# Patient Record
Sex: FEMALE | Race: WHITE | NOT HISPANIC OR LATINO | Employment: FULL TIME | URBAN - METROPOLITAN AREA
[De-identification: names, ages, dates, MRNs, and addresses within clinical notes are randomized per-mention and may not be internally consistent; named-entity substitution may affect disease eponyms.]

---

## 2021-04-26 LAB
HEPATITIS B SURFACE ANTIGEN (EXTERNAL): NEGATIVE
HIV1+2 AB SERPL QL IA: NEGATIVE
RUBELLA ANTIBODY IGG (EXTERNAL): NORMAL

## 2021-10-27 LAB — GROUP B STREPTOCOCCUS (EXTERNAL): NEGATIVE

## 2021-11-18 ENCOUNTER — HOSPITAL ENCOUNTER (OUTPATIENT)
Facility: CLINIC | Age: 36
Discharge: HOME | End: 2021-11-19
Attending: OBSTETRICS & GYNECOLOGY | Admitting: OBSTETRICS & GYNECOLOGY
Payer: COMMERCIAL

## 2021-11-18 PROBLEM — Z36.89 ENCOUNTER FOR TRIAGE IN PREGNANT PATIENT: Status: ACTIVE | Noted: 2021-11-18

## 2021-11-18 PROCEDURE — G0463 HOSPITAL OUTPT CLINIC VISIT: HCPCS

## 2021-11-18 RX ORDER — LIDOCAINE 40 MG/G
CREAM TOPICAL
Status: DISCONTINUED | OUTPATIENT
Start: 2021-11-18 | End: 2021-11-19 | Stop reason: HOSPADM

## 2021-11-19 VITALS
DIASTOLIC BLOOD PRESSURE: 81 MMHG | SYSTOLIC BLOOD PRESSURE: 132 MMHG | HEIGHT: 64 IN | BODY MASS INDEX: 30.39 KG/M2 | WEIGHT: 178 LBS | OXYGEN SATURATION: 97 % | TEMPERATURE: 97.9 F

## 2021-11-19 PROCEDURE — G0463 HOSPITAL OUTPT CLINIC VISIT: HCPCS

## 2021-11-19 RX ORDER — PRENATAL VIT/IRON FUM/FOLIC AC 27MG-0.8MG
1 TABLET ORAL DAILY
COMMUNITY

## 2021-11-19 RX ORDER — CHLORAL HYDRATE 500 MG
CAPSULE ORAL DAILY
COMMUNITY

## 2021-11-19 ASSESSMENT — MIFFLIN-ST. JEOR: SCORE: 1487.4

## 2021-11-19 NOTE — PLAN OF CARE
Data: Patient assessed in the [facility] for uterine contractions.  Cervical exam dilated to 1.  Membranes intact.  Contractions/uterine assessment unchanged in frequency/intensity from arrival.  Action:  Presumed adequate fetal oxygenation documented (see flow record). Discharge instructions reviewed.  Patient instructed to report change in fetal movement, vaginal leaking of fluid or bleeding, abdominal pain, or any concerns related to the pregnancy to her nurse/physician.    Response: Orders to discharge home per Odilia Ibarra.  Patient verbalized understanding of education and verbalized agreement with plan. Discharged to home at 0230.

## 2021-11-19 NOTE — PLAN OF CARE
Data: Patient presented to [facility]: 2021 11:45 PM.  Reason for maternal/fetal assessment is uterine contractions. Patient reports she was seen in clinic today and her membranes were swept, and she subsequently began having contractions at 1600.  The contractions have increased in frequency and discomfort since 1600.  Patient is a .  Prenatal record reviewed. Pregnancy has been uncomplicated..  Gestational Age 40w0d. VSS. Fetal movement active. Patient denies leaking of vaginal fluid/rupture of membranes, vaginal bleeding, nausea, vomiting, headache, visual disturbances, epigastric or URQ pain, significant edema. Support person is present.   Action: Verbal consent for EFM.  Tracing initially frequently broken and vacillating between FHR and MHR - uncertain if decels are present.  US report notable for anterior placenta.  Patient was repositioned to right side, EFM adjusted, pulse oximeter applied, revealing FHR baseline of 125 with moderate variability, accelerations, and no decelerations.  Triage assessment completed. Bill of rights reviewed.  Response: Patient verbalized agreement with plan. Will contact Dr Odilia Ibarra with update and for further orders.

## 2021-11-19 NOTE — PROVIDER NOTIFICATION
21 0055   Provider Notification   Provider Name/Title Dr. Ibarra   Method of Notification Phone   Request Evaluate - Remote   Notification Reason Patient Arrived;Membrane Status;Uterine Activity;Pain;SVE;Other (Comment)   Dr Odilia Ibarra informed of patient arrival and assessment including the following:    Reason for maternal/fetal assessment uterine contractions. Pt reports she was seen in clinic today and her membranes were swept, and she subsequently began having contractions at 1600.  The contractions have increased in frequency and discomfort since 1600, currently rates pain 3/10, and is talking through contractions.  Patient is a . Gestational Age 40w0d. VSS. Fetal movement active. Patient denies leaking of vaginal fluid/rupture of membranes, vaginal bleeding, nausea, vomiting, headache, visual disturbances, epigastric or URQ pain, significant edema. Tracing initially frequently broken and vacillating between FHR and MHR - uncertain if decels were present.  US report notable for anterior placenta.  Patient was repositioned to right side, EFM adjusted, pulse oximeter applied, revealing FHR baseline of 125 with moderate variability, accelerations, and no decelerations.. Plan per provider/orders received to recheck cervix in 1-1.5 hours; if cervix unchanged, orders to discharge patient to home.  If cervix changes or abnormalities in FHR tracing are present, plan to update MD for further orders.

## 2021-11-19 NOTE — DISCHARGE INSTRUCTIONS
Discharge Instruction for Undelivered Patients      You were seen for: Labor Assessment  We Consulted: Dr. Ibarra  You had (Test or Medicine):Fetal and Uterine Monitoring     Diet:   Drink 8 to 12 glasses of liquids (milk, juice, water) every day.  You may eat meals and snacks.     Activity:  Count fetal kicks everyday (see handout)  Call your doctor or nurse midwife if your baby is moving less than usual.     Call your provider if you notice:  Swelling in your face or increased swelling in your hands or legs.  Headaches that are not relieved by Tylenol (acetaminophen).  Changes in your vision (blurring: seeing spots or stars.)  Nausea (sick to your stomach) and vomiting (throwing up).   Weight gain of 5 pounds or more per week.  Heartburn that doesn't go away.  Signs of bladder infection: pain when you urinate (use the toilet), need to go more often and more urgently.  The bag of cespedes (rupture of membranes) breaks, or you notice leaking in your underwear.  Bright red blood in your underwear.  Abdominal (lower belly) or stomach pain.  For first baby: Contractions (tightening) less than 5 minutes apart for one hour or more.  Second (plus) baby: Contractions (tightening) less than 10 minutes apart and getting stronger.  *If less than 34 weeks: Contractions (tightening) more than 6 times in one hour.  Increase or change in vaginal discharge (note the color and amount)    Follow-up:  As scheduled in the clinic

## 2021-11-22 ENCOUNTER — ANESTHESIA EVENT (OUTPATIENT)
Dept: OBGYN | Facility: CLINIC | Age: 36
End: 2021-11-22
Payer: COMMERCIAL

## 2021-11-22 ENCOUNTER — ANESTHESIA (OUTPATIENT)
Dept: OBGYN | Facility: CLINIC | Age: 36
End: 2021-11-22
Payer: COMMERCIAL

## 2021-11-22 ENCOUNTER — HOSPITAL ENCOUNTER (INPATIENT)
Facility: CLINIC | Age: 36
LOS: 2 days | Discharge: HOME | End: 2021-11-24
Attending: OBSTETRICS & GYNECOLOGY | Admitting: OBSTETRICS & GYNECOLOGY
Payer: COMMERCIAL

## 2021-11-22 LAB
ABO/RH(D): ABNORMAL
ANTIBODY ID: NORMAL
ANTIBODY SCREEN: POSITIVE
HGB BLD-MCNC: 12.5 G/DL (ref 11.7–15.7)
SARS-COV-2 RNA RESP QL NAA+PROBE: NEGATIVE
SPECIMEN EXPIRATION DATE: ABNORMAL
SPECIMEN EXPIRATION DATE: NORMAL
T PALLIDUM AB SER QL: NONREACTIVE

## 2021-11-22 PROCEDURE — G0463 HOSPITAL OUTPT CLINIC VISIT: HCPCS

## 2021-11-22 PROCEDURE — 370N000003 HC ANESTHESIA WARD SERVICE

## 2021-11-22 PROCEDURE — 85018 HEMOGLOBIN: CPT | Performed by: OBSTETRICS & GYNECOLOGY

## 2021-11-22 PROCEDURE — 120N000001 HC R&B MED SURG/OB

## 2021-11-22 PROCEDURE — 86780 TREPONEMA PALLIDUM: CPT | Performed by: OBSTETRICS & GYNECOLOGY

## 2021-11-22 PROCEDURE — 86870 RBC ANTIBODY IDENTIFICATION: CPT | Performed by: OBSTETRICS & GYNECOLOGY

## 2021-11-22 PROCEDURE — 250N000011 HC RX IP 250 OP 636: Performed by: OBSTETRICS & GYNECOLOGY

## 2021-11-22 PROCEDURE — 0UQGXZZ REPAIR VAGINA, EXTERNAL APPROACH: ICD-10-PCS | Performed by: OBSTETRICS & GYNECOLOGY

## 2021-11-22 PROCEDURE — 86900 BLOOD TYPING SEROLOGIC ABO: CPT | Performed by: OBSTETRICS & GYNECOLOGY

## 2021-11-22 PROCEDURE — 250N000011 HC RX IP 250 OP 636: Performed by: ANESTHESIOLOGY

## 2021-11-22 PROCEDURE — 10907ZC DRAINAGE OF AMNIOTIC FLUID, THERAPEUTIC FROM PRODUCTS OF CONCEPTION, VIA NATURAL OR ARTIFICIAL OPENING: ICD-10-PCS | Performed by: OBSTETRICS & GYNECOLOGY

## 2021-11-22 PROCEDURE — 258N000003 HC RX IP 258 OP 636: Performed by: OBSTETRICS & GYNECOLOGY

## 2021-11-22 PROCEDURE — 722N000001 HC LABOR CARE VAGINAL DELIVERY SINGLE

## 2021-11-22 PROCEDURE — 250N000009 HC RX 250: Performed by: OBSTETRICS & GYNECOLOGY

## 2021-11-22 PROCEDURE — 00HU33Z INSERTION OF INFUSION DEVICE INTO SPINAL CANAL, PERCUTANEOUS APPROACH: ICD-10-PCS | Performed by: ANESTHESIOLOGY

## 2021-11-22 PROCEDURE — 87635 SARS-COV-2 COVID-19 AMP PRB: CPT | Performed by: OBSTETRICS & GYNECOLOGY

## 2021-11-22 PROCEDURE — 3E0R3BZ INTRODUCTION OF ANESTHETIC AGENT INTO SPINAL CANAL, PERCUTANEOUS APPROACH: ICD-10-PCS | Performed by: ANESTHESIOLOGY

## 2021-11-22 RX ORDER — ACETAMINOPHEN 325 MG/1
650 TABLET ORAL EVERY 4 HOURS PRN
Status: DISCONTINUED | OUTPATIENT
Start: 2021-11-22 | End: 2021-11-24 | Stop reason: HOSPADM

## 2021-11-22 RX ORDER — OXYTOCIN 10 [USP'U]/ML
10 INJECTION, SOLUTION INTRAMUSCULAR; INTRAVENOUS
Status: DISCONTINUED | OUTPATIENT
Start: 2021-11-22 | End: 2021-11-22 | Stop reason: HOSPADM

## 2021-11-22 RX ORDER — METOCLOPRAMIDE 10 MG/1
10 TABLET ORAL EVERY 6 HOURS PRN
Status: DISCONTINUED | OUTPATIENT
Start: 2021-11-22 | End: 2021-11-22 | Stop reason: HOSPADM

## 2021-11-22 RX ORDER — METOCLOPRAMIDE HYDROCHLORIDE 5 MG/ML
10 INJECTION INTRAMUSCULAR; INTRAVENOUS EVERY 6 HOURS PRN
Status: DISCONTINUED | OUTPATIENT
Start: 2021-11-22 | End: 2021-11-22 | Stop reason: HOSPADM

## 2021-11-22 RX ORDER — CARBOPROST TROMETHAMINE 250 UG/ML
250 INJECTION, SOLUTION INTRAMUSCULAR
Status: DISCONTINUED | OUTPATIENT
Start: 2021-11-22 | End: 2021-11-22

## 2021-11-22 RX ORDER — OXYTOCIN/0.9 % SODIUM CHLORIDE 30/500 ML
1-24 PLASTIC BAG, INJECTION (ML) INTRAVENOUS CONTINUOUS
Status: DISCONTINUED | OUTPATIENT
Start: 2021-11-22 | End: 2021-11-22 | Stop reason: HOSPADM

## 2021-11-22 RX ORDER — DEXTROSE, SODIUM CHLORIDE, SODIUM LACTATE, POTASSIUM CHLORIDE, AND CALCIUM CHLORIDE 5; .6; .31; .03; .02 G/100ML; G/100ML; G/100ML; G/100ML; G/100ML
INJECTION, SOLUTION INTRAVENOUS CONTINUOUS
Status: DISCONTINUED | OUTPATIENT
Start: 2021-11-22 | End: 2021-11-22

## 2021-11-22 RX ORDER — KETOROLAC TROMETHAMINE 30 MG/ML
30 INJECTION, SOLUTION INTRAMUSCULAR; INTRAVENOUS
Status: DISCONTINUED | OUTPATIENT
Start: 2021-11-22 | End: 2021-11-24 | Stop reason: HOSPADM

## 2021-11-22 RX ORDER — MISOPROSTOL 200 UG/1
400 TABLET ORAL
Status: DISCONTINUED | OUTPATIENT
Start: 2021-11-22 | End: 2021-11-22 | Stop reason: HOSPADM

## 2021-11-22 RX ORDER — IBUPROFEN 600 MG/1
600 TABLET, FILM COATED ORAL
Status: DISCONTINUED | OUTPATIENT
Start: 2021-11-22 | End: 2021-11-24 | Stop reason: HOSPADM

## 2021-11-22 RX ORDER — OXYTOCIN/0.9 % SODIUM CHLORIDE 30/500 ML
100-340 PLASTIC BAG, INJECTION (ML) INTRAVENOUS CONTINUOUS PRN
Status: DISCONTINUED | OUTPATIENT
Start: 2021-11-22 | End: 2021-11-22

## 2021-11-22 RX ORDER — MISOPROSTOL 200 UG/1
800 TABLET ORAL
Status: DISCONTINUED | OUTPATIENT
Start: 2021-11-22 | End: 2021-11-22

## 2021-11-22 RX ORDER — ONDANSETRON 2 MG/ML
4 INJECTION INTRAMUSCULAR; INTRAVENOUS EVERY 6 HOURS PRN
Status: DISCONTINUED | OUTPATIENT
Start: 2021-11-22 | End: 2021-11-22 | Stop reason: HOSPADM

## 2021-11-22 RX ORDER — DOCUSATE SODIUM 100 MG/1
100 CAPSULE, LIQUID FILLED ORAL DAILY
Status: DISCONTINUED | OUTPATIENT
Start: 2021-11-22 | End: 2021-11-24 | Stop reason: HOSPADM

## 2021-11-22 RX ORDER — OXYTOCIN 10 [USP'U]/ML
10 INJECTION, SOLUTION INTRAMUSCULAR; INTRAVENOUS
Status: DISCONTINUED | OUTPATIENT
Start: 2021-11-22 | End: 2021-11-22

## 2021-11-22 RX ORDER — LIDOCAINE 40 MG/G
CREAM TOPICAL
Status: DISCONTINUED | OUTPATIENT
Start: 2021-11-22 | End: 2021-11-22 | Stop reason: HOSPADM

## 2021-11-22 RX ORDER — TRANEXAMIC ACID 10 MG/ML
1 INJECTION, SOLUTION INTRAVENOUS EVERY 30 MIN PRN
Status: DISCONTINUED | OUTPATIENT
Start: 2021-11-22 | End: 2021-11-22 | Stop reason: HOSPADM

## 2021-11-22 RX ORDER — OXYTOCIN/0.9 % SODIUM CHLORIDE 30/500 ML
340 PLASTIC BAG, INJECTION (ML) INTRAVENOUS CONTINUOUS PRN
Status: DISCONTINUED | OUTPATIENT
Start: 2021-11-22 | End: 2021-11-22

## 2021-11-22 RX ORDER — PROCHLORPERAZINE MALEATE 10 MG
10 TABLET ORAL EVERY 6 HOURS PRN
Status: DISCONTINUED | OUTPATIENT
Start: 2021-11-22 | End: 2021-11-22 | Stop reason: HOSPADM

## 2021-11-22 RX ORDER — BUPIVACAINE HYDROCHLORIDE 2.5 MG/ML
INJECTION, SOLUTION EPIDURAL; INFILTRATION; INTRACAUDAL
Status: COMPLETED | OUTPATIENT
Start: 2021-11-22 | End: 2021-11-22

## 2021-11-22 RX ORDER — NALOXONE HYDROCHLORIDE 0.4 MG/ML
0.2 INJECTION, SOLUTION INTRAMUSCULAR; INTRAVENOUS; SUBCUTANEOUS
Status: DISCONTINUED | OUTPATIENT
Start: 2021-11-22 | End: 2021-11-22 | Stop reason: HOSPADM

## 2021-11-22 RX ORDER — IBUPROFEN 800 MG/1
800 TABLET, FILM COATED ORAL EVERY 6 HOURS PRN
Status: DISCONTINUED | OUTPATIENT
Start: 2021-11-22 | End: 2021-11-24 | Stop reason: HOSPADM

## 2021-11-22 RX ORDER — MISOPROSTOL 200 UG/1
800 TABLET ORAL
Status: DISCONTINUED | OUTPATIENT
Start: 2021-11-22 | End: 2021-11-22 | Stop reason: HOSPADM

## 2021-11-22 RX ORDER — METHYLERGONOVINE MALEATE 0.2 MG/ML
200 INJECTION INTRAVENOUS
Status: DISCONTINUED | OUTPATIENT
Start: 2021-11-22 | End: 2021-11-22

## 2021-11-22 RX ORDER — NALOXONE HYDROCHLORIDE 0.4 MG/ML
0.4 INJECTION, SOLUTION INTRAMUSCULAR; INTRAVENOUS; SUBCUTANEOUS
Status: DISCONTINUED | OUTPATIENT
Start: 2021-11-22 | End: 2021-11-22 | Stop reason: HOSPADM

## 2021-11-22 RX ORDER — HYDROCORTISONE 2.5 %
CREAM (GRAM) TOPICAL 3 TIMES DAILY PRN
Status: DISCONTINUED | OUTPATIENT
Start: 2021-11-22 | End: 2021-11-24 | Stop reason: HOSPADM

## 2021-11-22 RX ORDER — FENTANYL CITRATE-0.9 % NACL/PF 10 MCG/ML
100 PLASTIC BAG, INJECTION (ML) INTRAVENOUS EVERY 5 MIN PRN
Status: DISCONTINUED | OUTPATIENT
Start: 2021-11-22 | End: 2021-11-22 | Stop reason: HOSPADM

## 2021-11-22 RX ORDER — SODIUM CHLORIDE, SODIUM LACTATE, POTASSIUM CHLORIDE, CALCIUM CHLORIDE 600; 310; 30; 20 MG/100ML; MG/100ML; MG/100ML; MG/100ML
INJECTION, SOLUTION INTRAVENOUS CONTINUOUS
Status: DISCONTINUED | OUTPATIENT
Start: 2021-11-22 | End: 2021-11-22 | Stop reason: HOSPADM

## 2021-11-22 RX ORDER — NALBUPHINE HYDROCHLORIDE 10 MG/ML
2.5-5 INJECTION, SOLUTION INTRAMUSCULAR; INTRAVENOUS; SUBCUTANEOUS EVERY 6 HOURS PRN
Status: DISCONTINUED | OUTPATIENT
Start: 2021-11-22 | End: 2021-11-22

## 2021-11-22 RX ORDER — PROCHLORPERAZINE 25 MG
25 SUPPOSITORY, RECTAL RECTAL EVERY 12 HOURS PRN
Status: DISCONTINUED | OUTPATIENT
Start: 2021-11-22 | End: 2021-11-22 | Stop reason: HOSPADM

## 2021-11-22 RX ORDER — CARBOPROST TROMETHAMINE 250 UG/ML
250 INJECTION, SOLUTION INTRAMUSCULAR
Status: DISCONTINUED | OUTPATIENT
Start: 2021-11-22 | End: 2021-11-22 | Stop reason: HOSPADM

## 2021-11-22 RX ORDER — MISOPROSTOL 200 UG/1
400 TABLET ORAL
Status: DISCONTINUED | OUTPATIENT
Start: 2021-11-22 | End: 2021-11-22

## 2021-11-22 RX ORDER — OXYTOCIN/0.9 % SODIUM CHLORIDE 30/500 ML
340 PLASTIC BAG, INJECTION (ML) INTRAVENOUS CONTINUOUS PRN
Status: DISCONTINUED | OUTPATIENT
Start: 2021-11-22 | End: 2021-11-22 | Stop reason: HOSPADM

## 2021-11-22 RX ORDER — MODIFIED LANOLIN
OINTMENT (GRAM) TOPICAL
Status: DISCONTINUED | OUTPATIENT
Start: 2021-11-22 | End: 2021-11-24 | Stop reason: HOSPADM

## 2021-11-22 RX ORDER — BISACODYL 10 MG
10 SUPPOSITORY, RECTAL RECTAL DAILY PRN
Status: DISCONTINUED | OUTPATIENT
Start: 2021-11-22 | End: 2021-11-24 | Stop reason: HOSPADM

## 2021-11-22 RX ORDER — SODIUM CHLORIDE, SODIUM LACTATE, POTASSIUM CHLORIDE, CALCIUM CHLORIDE 600; 310; 30; 20 MG/100ML; MG/100ML; MG/100ML; MG/100ML
INJECTION, SOLUTION INTRAVENOUS CONTINUOUS PRN
Status: DISCONTINUED | OUTPATIENT
Start: 2021-11-22 | End: 2021-11-22 | Stop reason: HOSPADM

## 2021-11-22 RX ORDER — FENTANYL CITRATE 50 UG/ML
50-100 INJECTION, SOLUTION INTRAMUSCULAR; INTRAVENOUS
Status: DISCONTINUED | OUTPATIENT
Start: 2021-11-22 | End: 2021-11-22 | Stop reason: HOSPADM

## 2021-11-22 RX ORDER — ONDANSETRON 4 MG/1
4 TABLET, ORALLY DISINTEGRATING ORAL EVERY 6 HOURS PRN
Status: DISCONTINUED | OUTPATIENT
Start: 2021-11-22 | End: 2021-11-22 | Stop reason: HOSPADM

## 2021-11-22 RX ORDER — TRANEXAMIC ACID 10 MG/ML
1 INJECTION, SOLUTION INTRAVENOUS EVERY 30 MIN PRN
Status: DISCONTINUED | OUTPATIENT
Start: 2021-11-22 | End: 2021-11-22

## 2021-11-22 RX ADMIN — Medication: at 06:09

## 2021-11-22 RX ADMIN — FENTANYL CITRATE 100 MCG: 50 INJECTION INTRAMUSCULAR; INTRAVENOUS at 04:39

## 2021-11-22 RX ADMIN — Medication 1 MILLI-UNITS/MIN: at 11:16

## 2021-11-22 RX ADMIN — KETOROLAC TROMETHAMINE 30 MG: 30 INJECTION, SOLUTION INTRAMUSCULAR at 18:48

## 2021-11-22 RX ADMIN — FENTANYL CITRATE 100 MCG: 50 INJECTION INTRAMUSCULAR; INTRAVENOUS at 02:50

## 2021-11-22 RX ADMIN — SODIUM CHLORIDE, POTASSIUM CHLORIDE, SODIUM LACTATE AND CALCIUM CHLORIDE: 600; 310; 30; 20 INJECTION, SOLUTION INTRAVENOUS at 17:24

## 2021-11-22 RX ADMIN — BUPIVACAINE HYDROCHLORIDE 8 ML: 2.5 INJECTION, SOLUTION EPIDURAL; INFILTRATION; INTRACAUDAL at 06:03

## 2021-11-22 RX ADMIN — SODIUM CHLORIDE, SODIUM LACTATE, POTASSIUM CHLORIDE, CALCIUM CHLORIDE AND DEXTROSE MONOHYDRATE: 5; 600; 310; 30; 20 INJECTION, SOLUTION INTRAVENOUS at 09:22

## 2021-11-22 RX ADMIN — SODIUM CHLORIDE, POTASSIUM CHLORIDE, SODIUM LACTATE AND CALCIUM CHLORIDE: 600; 310; 30; 20 INJECTION, SOLUTION INTRAVENOUS at 02:40

## 2021-11-22 ASSESSMENT — ACTIVITIES OF DAILY LIVING (ADL)
ADLS_ACUITY_SCORE: 3

## 2021-11-22 ASSESSMENT — MIFFLIN-ST. JEOR: SCORE: 1487.4

## 2021-11-22 NOTE — PROGRESS NOTES
"LABOR NOTE    Subjective: Reports increasing pain with contractions. Requests a second dose of fentanyl. Pain noted mostly in her lower back, with some coupling of ctx on toco. Prefers to rest in bed on her back.    Objective:  /66   Temp 98.6  F (37  C) (Oral)   Resp 16   Ht 1.626 m (5' 4\")   Wt 80.7 kg (178 lb)   BMI 30.55 kg/m     FHT: category 1  [place]: q 2-4 min  SVE: 3.5/60/-2    Assessment and Plan:    1. 36 year old  female at 40w3d  2. FHT category 1.   3. Presentation consistent with occiput posterior positioning    Latent labor  --admit for expectant management  --presents with birth plan, reviewed by RN  --undecided on pain control plan  --requests to avoid AROM  --GBS neg     Rh negative  --s/p Rhogam     Hx anxiety/depression  --no meds  --PPD risk     Hx asthma  --stable     Hx abn pap with colposcopy  --no LEEP     Hx migraines  --stable x 3 years      Holley Barrera MD    "

## 2021-11-22 NOTE — ANESTHESIA PROCEDURE NOTES
Dural puncture epidural Procedure Note    Pre-Procedure   Staff -        Anesthesiologist:  Caitlin Rich MD       Performed By: anesthesiologist       Location: OB       Procedure Start/Stop Times: 11/22/2021 5:51 AM and 11/22/2021 6:10 AM       Pre-Anesthestic Checklist: patient identified, IV checked, risks and benefits discussed, informed consent, monitors and equipment checked and pre-op evaluation  Timeout:       Correct Patient: Yes        Correct Procedure: Yes        Correct Site: Yes        Correct Position: Yes   Procedure Documentation  Procedure: dural puncture epidural       Diagnosis: labor pain       Patient Position: sitting       Skin prep: Chloraprep       Local skin infiltrated with 3 mL of 2% lidocaine.        Insertion Site: L3-4. (midline approach).       Technique: LORT saline        ANGELES at 5 cm.       Needle Type: [employer]       Needle Gauge: 17.        Needle Length (Inches): 3.5        Spinal Needle Type: Pencan       Spinal Needle (gauge): 25        Spinal Needle Length (inches): 4.69       Catheter: 19 G.         Catheter threaded easily.         5 cm epidural space.         Threaded 10 cm at skin.         # of attempts: 1 and  # of redirects:  2    Assessment/Narrative         Paresthesias: No.       Test dose of 3 mL lidocaine 1.5% w/ 1:200,000 epinephrine at 05:59 CST.         Test dose negative, 3 minutes after injection, for signs of intravascular, subdural, or intrathecal injection.       Insertion/Infusion Method: LORT saline       Aspiration negative for Heme or CSF via Epidural Catheter.       Sensory Level Left: T10.       Sensory Level Right: T10.       CSF fluid: with Spinal needle.CSF fluid removed: with Epidural needle - not with Epidural needle.    Medication(s) Administered   0.25% Bupivacaine PF (Intrathecal), 8 mL  Medication Administration Time: 11/22/2021 6:03 AM

## 2021-11-22 NOTE — H&P
"  2021    Cintia Fang  [id number]            OB Admit History & Physical      Ms. Fang  is here as an admission for labor.    She has noticed contractions since about 2100, with an increase in intensity at about 2230. On arrival, she was marilyn every 1-4 minutes, mild to moderate in intensity. She was noted to have made change from clinic, and was admitted for expectant management.    No LMP recorded. Patient is pregnant.   Her Estimated Date of Delivery: 2021  , making her 40w3d  wks.      Estimated body mass index is 30.55 kg/m  as calculated from the following:    Height as of this encounter: 1.626 m (5' 4\").    Weight as of this encounter: 80.7 kg (178 lb).  Her prenatal course has been complicated by:  ASCUS with positive high risk HPV cervical 2015   Colpo: neg. Repeat pap with HPV 2015: ; ASCUS with positive other high risk HPV     Asthma (ACG)     Eczema, dyshidrotic 8/15/2011     Generalized anxiety disorder (HRC) 3/12/2014   Stable, no meds     Generalized headaches 2012     Lumbago (HRC) 2006   Pain Low Back     Major depressive disorder, recurrent episode, moderate (HRC) 3/12/2014     Migraine, chronic, without aura 3/12/2014   Managed with diet, exercise, no meds     Psychological factors associated with another disorder (HRC) 3/12/2014   Psychic factors associated with diseases classified elsewhere     STD (sexually transmitted disease) (HRC)   hpv     Traumatic closed nondisplaced fracture of distal end of left fibula 2019   Added automatically from request for surgery [id number]     Varicella     See prenatal for labs.  neg GBBS, Rubella Immune, RH negative s/p Rhogam    Estimated fetal weight= AGA       She is a 36 year old   Her OB history:   OB History    Para Term  AB Living   2 0 0 0 1 0   SAB IAB Ectopic Multiple Live Births   0 0 0 0 0      # Outcome Date GA Lbr Huang/2nd Weight Sex Delivery Anes PTL Lv   2 Current            1 " AB                     Past Medical History:   Diagnosis Date     Migraines      Uncomplicated asthma     use inhaler prn          Past Surgical History:   Procedure Laterality Date     ANKLE SURGERY Left      C ORAL SURGERY PROCEDURE      wisdom teeth removed     COSMETIC SURGERY      reconstructive surgery - face - following dog bite     TONSILLECTOMY           No current outpatient medications on file.       Allergies: Oxycodone-acetaminophen      REVIEW OF SYSTEMS:  NEUROLOGIC:  Negative  EYES:  Negative  ENT:  Negative  GI:  Negative  BREAST:  Negative  :  Negative  GYN:  Negative  CV:  Negative  PULMONARY:  Negative  MUSCULOSKELETAL:  Negative  PSYCH:  Negative        Social History     Socioeconomic History     Marital status:      Spouse name: Not on file     Number of children: Not on file     Years of education: Not on file     Highest education level: Not on file   Occupational History     Not on file   Tobacco Use     Smoking status: Former Smoker     Smokeless tobacco: Never Used   Substance and Sexual Activity     Alcohol use: Not Currently     Drug use: Not Currently     Sexual activity: Yes   Other Topics Concern     Not on file   Social History Narrative     Not on file     Social Determinants of Health     Financial Resource Strain: Not on file   Food Insecurity: Not on file   Transportation Needs: Not on file   Physical Activity: Not on file   Stress: Not on file   Social Connections: Not on file   Intimate Partner Violence: Not on file   Housing Stability: Not on file      History reviewed. No pertinent family history.    Vitals:   FHT category 1    Alert Awake in NAD  HEENT grossly normal  Neck: no lymphadenopathy or thryoidomegaly  Lungs CTAB  Back no spinal or CVAT  Heart RRR  ABD gravid, nontender on exam with vertex palpable  Pelvic:  no fluid noted, no blood noted  Cervix is 2-3 cm / 60 % effaced at -2 station  EXT:  no edema or calf tenderness  Neuro:  Grossly  intact    Assessment/Plan:  IUP at 40w3d      Latent labor  --admit for expectant management  --presents with birth plan, reviewed by RN  --undecided on pain control plan  --requests to avoid AROM  --GBS neg    Rh negative  --s/p Rhogam    Hx anxiety/depression  --no meds  --PPD risk    Hx asthma  --stable    Hx abn pap with colposcopy  --no LEEP    Hx migraines  --stable x 3 years    Holley Barrera MD  Dept of OB/GYN  November 22, 2021

## 2021-11-22 NOTE — PROVIDER NOTIFICATION
11/22/21 0730   Provider Notification   Provider Name/Title Dr Ibarra   Method of Notification At Bedside   Comments   Comments EFM tracing reviewed. Discussed with pt benefit of AROM RT BBOW. Pt agrees to proceed with AROM.

## 2021-11-22 NOTE — PROVIDER NOTIFICATION
11/22/21 1151   Provider Notification   Provider Name/Title Dr Ibarra   Method of Notification In Department   Comments   Comments Updated on ctx pattern following the starting of Pitocin augmentation.

## 2021-11-22 NOTE — PROVIDER NOTIFICATION
11/22/21 0048   Provider Notification   Provider Name/Title    Method of Notification Phone   Request Evaluate - Remote   Notification Reason Patient Arrived;Membrane Status;Labor Status;Uterine Activity;Pain;SVE     Dr. Holley Barrera informed of patient arrival and assessment including the following:    Reason for maternal/fetal assessment uterine contractions. Pt reports contractions started at 2100 and became stronger at 2230. Fetal status normal baseline, moderate variability, accelerations present and no decelerations. Contractions every 1-4 min, palpating mild to moderate. Patient reports coping with labor, able to breath with contractions at this point. Prenatal history reviewed, GBS negative. Birth plan reviewed.Plan per provider/orders received for admit to labor, intrapartum orders received, nitrous oxide ok, IV fentanyl ok, epidural ok, intermittent monitoring ok, place IV, collect labs (T&S, hemoglobin and anti-trepnoma) collect Covid swab. POC discussed with patient and FOB.

## 2021-11-22 NOTE — PROGRESS NOTES
Late Entry from 0740    Patient is doing well.  Much more comfortable s/p epidural.  She is amenable to AROM.    FHT: 130/mod/+accel/-decel  [place]: q 3-6 min  SVE: 4-5/80/-2  AROM'ed with clear fluid. Head well applied to cervix with no cord prolapse.  FSE was also placed after AROM as FHR noted to be in low 100s, but not continuous.  FHT cat I after FSE placed.     A&P: 37yo  at 40+3 wga, spontaneous labor  - Labor: Augmentation with uncomplicated AROM.  If contractions space, will start pitocin.  - h/o Anx, Dep: no meds.  Mood stable with no SI/HI  - A neg: Rhogam postpartum, pending infant's blood type  - GBS neg  - PP contraception: POPs  - Anticipate

## 2021-11-22 NOTE — ANESTHESIA PREPROCEDURE EVALUATION
Anesthesia Pre-Procedure Evaluation    Patient: Cintia Fang   MRN: [id number] : 1985        Preoperative Diagnosis: * No surgery found *    Procedure :           Past Medical History:   Diagnosis Date     Migraines      Uncomplicated asthma     use inhaler prn      Past Surgical History:   Procedure Laterality Date     ANKLE SURGERY Left      C ORAL SURGERY PROCEDURE      wisdom teeth removed     COSMETIC SURGERY      reconstructive surgery - face - following dog bite     TONSILLECTOMY        Allergies   Allergen Reactions     Oxycodone-Acetaminophen Nausea and Vomiting      Social History     Tobacco Use     Smoking status: Former Smoker     Smokeless tobacco: Never Used   Substance Use Topics     Alcohol use: Not Currently      Wt Readings from Last 1 Encounters:   21 80.7 kg (178 lb)        Anesthesia Evaluation            ROS/MED HX  ENT/Pulmonary:     (+) asthma     Neurologic:       Cardiovascular:  - neg cardiovascular ROS     METS/Exercise Tolerance:     Hematologic:       Musculoskeletal:       GI/Hepatic:       Renal/Genitourinary:       Endo:       Psychiatric/Substance Use:       Infectious Disease:       Malignancy:       Other:            Physical Exam    Airway        Mallampati: II   TM distance: > 3 FB   Neck ROM: full     Respiratory Devices and Support         Dental           Cardiovascular   cardiovascular exam normal          Pulmonary   pulmonary exam normal                OUTSIDE LABS:  CBC:   Lab Results   Component Value Date    HGB 12.5 2021     BMP: No results found for: NA, POTASSIUM, CHLORIDE, CO2, BUN, CR, GLC  COAGS: No results found for: PTT, INR, FIBR  POC: No results found for: BGM, HCG, HCGS  HEPATIC: No results found for: ALBUMIN, PROTTOTAL, ALT, AST, GGT, ALKPHOS, BILITOTAL, BILIDIRECT, YULIYA  OTHER: No results found for: PH, LACT, A1C, SAUMYA, PHOS, MAG, LIPASE, AMYLASE, TSH, T4, T3, CRP, SED    Anesthesia Plan    ASA Status:  2      Anesthesia Type:  Epidural.              Consents    Anesthesia Plan(s) and associated risks, benefits, and realistic alternatives discussed. Questions answered and patient/representative(s) expressed understanding.    - Discussed:     - Discussed with:  Patient         Postoperative Care            Comments:                Caitlin Rich MD

## 2021-11-22 NOTE — PLAN OF CARE
SVE 9.5/100/0, pt has cervix along riht side. Pt is having to breath with contractions, able to move  Lower extremities freely now which is a change. Pt has used self administration of epidural without relief- MDA paged for rebolus And pt repositioned to right side

## 2021-11-22 NOTE — PROVIDER NOTIFICATION
11/22/21 1338   Provider Notification   Provider Name/Title Dr Ibarra   Method of Notification In Department   Comments   Comments updated on labor status, ctx pattern and EFM tracing. Will re check SVE at 1416

## 2021-11-22 NOTE — PROVIDER NOTIFICATION
11/22/21 0306   Provider Notification   Provider Name/Title    Method of Notification In Department   Request Evaluate - Remote   Notification Reason Labor Status;Uterine Activity;Pain;Status Update;SVE    in the department and updated. Patient reporting increasing contraction pain along with back pain and requesting pain medication. SVE 3.5/70/-2 with BBOW. Discussed pain management options and patient declined nitrous oxide and requested IV fentanyl. IV fentanyl given and reported good relief. FHT category 1 tracing with moderate variability with accelerations and no decelerations. Contractions every 1-3 min apart, moderate with palpation. No new orders received, continue with plan of care.

## 2021-11-22 NOTE — PROVIDER NOTIFICATION
11/22/21 1015   Provider Notification   Provider Name/Title Dr Ibarra   Method of Notification At Bedside   Comments   Comments EFM tracing reviewed. SVE 5-6/80/-1 station. Discussed option for augmenting labor with Pitocin. Pt wishes to wait for 30 minutes before starting. Both agree with POC.

## 2021-11-22 NOTE — PROVIDER NOTIFICATION
11/22/21 1739   Provider Notification   Provider Name/Title Ophelia   Method of Notification In Department   Notification Reason Labor Status;Uterine Activity;Pain;Status Update;KENNEY Ibarra on umit , update on VE and setting up to push

## 2021-11-22 NOTE — PROVIDER NOTIFICATION
11/22/21 1045 11/22/21 1116   Comments   Comments Pt wishes to wait another 30 minutes before starting any pitocin Reviewed ctx pattern with pt and her . Both agree to start Pitocin augmentation. All questions were answered at this time.

## 2021-11-22 NOTE — PROVIDER NOTIFICATION
11/22/21 0555   Provider Notification   Provider Name/Title Dr Rich   Method of Notification At Bedside   Comments   Comments Epidural procedure explained including risks. Pt agrees to proceed with epidural. Informed consent id signed. Time out completed.

## 2021-11-22 NOTE — PLAN OF CARE
Data: [facility]: 2021 12:04 AM.  Reason for maternal/fetal assessment is uterine contractions. Patient reports contractions started at 2100 and became stronger by 2230.  Patient is a .  Prenatal record reviewed. Pregnancy has been uncomplicated..  Gestational Age 40w3d. VSS. Fetal movement active. Patient denies leaking of vaginal fluid/rupture of membranes, vaginal bleeding, abdominal pain, pelvic pressure, nausea, vomiting, headache, visual disturbances, epigastric or URQ pain, significant edema. Support person is present.   Action: Verbal consent for EFM. Triage assessment completed. Bill of rights reviewed.  Response: Patient verbalized agreement with plan. Will contact Dr Holley Barrrea with update and for further orders.

## 2021-11-23 LAB — HGB BLD-MCNC: 10.1 G/DL (ref 11.7–15.7)

## 2021-11-23 PROCEDURE — 999N000080 HC STATISTIC IP LACTATION SERVICES 16-30 MIN

## 2021-11-23 PROCEDURE — 85018 HEMOGLOBIN: CPT | Performed by: OBSTETRICS & GYNECOLOGY

## 2021-11-23 PROCEDURE — 36415 COLL VENOUS BLD VENIPUNCTURE: CPT | Performed by: OBSTETRICS & GYNECOLOGY

## 2021-11-23 PROCEDURE — 120N000001 HC R&B MED SURG/OB

## 2021-11-23 PROCEDURE — 250N000013 HC RX MED GY IP 250 OP 250 PS 637: Performed by: OBSTETRICS & GYNECOLOGY

## 2021-11-23 RX ORDER — MODIFIED LANOLIN
OINTMENT (GRAM) TOPICAL
Qty: 7 G | Refills: 1 | Status: SHIPPED | OUTPATIENT
Start: 2021-11-23

## 2021-11-23 RX ORDER — IBUPROFEN 800 MG/1
800 TABLET, FILM COATED ORAL EVERY 6 HOURS PRN
Qty: 40 TABLET | Refills: 1 | Status: SHIPPED | OUTPATIENT
Start: 2021-11-23

## 2021-11-23 RX ORDER — DOCUSATE SODIUM 100 MG/1
100 CAPSULE, LIQUID FILLED ORAL DAILY
Qty: 30 CAPSULE | Refills: 0 | Status: SHIPPED | OUTPATIENT
Start: 2021-11-24

## 2021-11-23 RX ORDER — HYDROCORTISONE 2.5 %
CREAM (GRAM) TOPICAL 3 TIMES DAILY PRN
Qty: 30 G | Refills: 0 | Status: SHIPPED | OUTPATIENT
Start: 2021-11-23

## 2021-11-23 RX ADMIN — IBUPROFEN 800 MG: 800 TABLET, FILM COATED ORAL at 04:03

## 2021-11-23 RX ADMIN — IBUPROFEN 800 MG: 800 TABLET, FILM COATED ORAL at 10:57

## 2021-11-23 ASSESSMENT — ACTIVITIES OF DAILY LIVING (ADL)
ADLS_ACUITY_SCORE: 3

## 2021-11-23 NOTE — LACTATION NOTE
Lactation in to see patient. Discussed basic breastfeeding education. Reviewed first 24 hour feeding behaviors.   Called in to assist with a feed. Baby latched well with assist in cross cradle, with multiple swallows heard. Encouraged breast compressions, and directing infant to breast for proper latch. Knows to call if needing assistance with latch or questions.

## 2021-11-23 NOTE — DISCHARGE SUMMARY
[facility] Discharge Summary    Cintia Fang MRN# [id number]   Age: 36 year old YOB: 1985     Date of Admission:  2021  Date of Discharge::  21  Admitting Physician:  Holley Barrera MD  Discharge Physician:  Irving Camejo MD   Home clinic: [facility]          Admission Diagnoses:   Indication for care in labor or delivery [O75.9]  Rh negative          Discharge Diagnosis:   Normal spontaneous vaginal delivery  Intrauterine pregnancy at 40 weeks gestation  Acute blood loss anemia          Procedures:   Procedure(s):        No other procedures performed during this admission           Medications Prior to Admission:     Medications Prior to Admission   Medication Sig Dispense Refill Last Dose     ALBUTEROL IN Inhale 1-2 puffs into the lungs daily as needed   More than a month at Unknown time     fish oil-omega-3 fatty acids 1000 MG capsule Take by mouth daily   2021 at Unknown time     Prenatal Vit-Fe Fumarate-FA (PRENATAL MULTIVITAMIN W/IRON) 27-0.8 MG tablet Take 1 tablet by mouth daily   2021 at Unknown time             Discharge Medications:     Current Discharge Medication List      START taking these medications    Details   benzocaine (AMERICAINE) 20 % external aerosol Apply to perineum as needed for pain  Qty: 57 g, Refills: 0    Associated Diagnoses: Single liveborn infant delivered vaginally      docusate sodium (COLACE) 100 MG capsule Take 1 capsule (100 mg) by mouth daily  Qty: 30 capsule, Refills: 0    Associated Diagnoses: Single liveborn infant delivered vaginally      hydrocortisone 2.5 % cream Place rectally 3 times daily as needed (hemorrhoids)  Qty: 30 g, Refills: 0    Associated Diagnoses: Single liveborn infant delivered vaginally      ibuprofen (ADVIL/MOTRIN) 800 MG tablet Take 1 tablet (800 mg) by mouth every 6 hours as needed for other (cramping)  Qty: 40 tablet, Refills: 1    Associated Diagnoses: Single  liveborn infant delivered vaginally      lanolin ointment Apply topically every hour as needed for other (sore nipples)  Qty: 7 g, Refills: 1    Associated Diagnoses: Single liveborn infant delivered vaginally         CONTINUE these medications which have NOT CHANGED    Details   ALBUTEROL IN Inhale 1-2 puffs into the lungs daily as needed      fish oil-omega-3 fatty acids 1000 MG capsule Take by mouth daily      Prenatal Vit-Fe Fumarate-FA (PRENATAL MULTIVITAMIN W/IRON) 27-0.8 MG tablet Take 1 tablet by mouth daily                   Consultations:   No consultations were requested during this admission          Brief History of Labor:   Cintia Fang is a 36 year old  who was admitted at 40w3d for spontaneous labor .  Pregnancy was complicated by A neg, GBS neg, anx/dep (no meds).  She underwent labor augmentation with AROM and pitocin.  AROM occurred notable for clear fluid. She progressed to fully and began pushing effectively. Pelvis was noted to be adequate.  She delivered a viable male infant with APGARs pending.  Head delivered in an DARI position, restituted to ROT and delivered without difficulty.  Left anterior shoulder delivered first, followed by right posterior shoulder without complication, and then rest of body. Spontaneous delivery of a placenta with a 3-V cord ensued shortly thereafter.  Placenta was examined and noted to be intact. She received IV pitocin as a uterotonic agent.  Exam of the perineum revealed no perineal laceration, but bilateral vaginal lacerations, the left of which was repaired in standard fashion using a 3-0 monocryl suture.  QBL 128cc.             Hospital Course:   The patient's hospital course was unremarkable.  On discharge, her pain was well controlled. Vaginal bleeding is similar to peak menstrual flow.  Voiding without difficulty.  Ambulating well and tolerating a normal diet.  No fever.  Breastfeeding well.  Infant is stable. She was discharged on post-partum  day #2.    Undecided on birth control.  Baby Rh negative.  Patient declined oral iron Rx.    Post-partum hemoglobin:   Hemoglobin   Date Value Ref Range Status   11/23/2021 10.1 (L) 11.7 - 15.7 g/dL Final             Discharge Instructions and Follow-Up:   Discharge diet: Regular   Discharge activity: Activity as tolerated   Discharge follow-up: Follow up with primary care provider in 6 weeks   Wound care: Drink plenty of fluids  Ice to area for comfort           Discharge Disposition:   Discharged to home      Attestation:  I have reviewed today's vital signs, notes, medications, labs.    Irving Camejo MD

## 2021-11-23 NOTE — PLAN OF CARE
Data: Cintia Fang transferred to 450 via wheelchair at 2030. Baby transferred via parent's arms.  Action: Receiving unit notified of transfer: Yes. Patient and family notified of room change. Report given to Josie at 2040. Belongings sent to receiving unit. Accompanied by Registered Nurse. Oriented patient to surroundings. Call light within reach. ID bands double-checked with receiving RN.  Response: Patient tolerated transfer and is stable.    Pt was able to walk to the bathroom at and void at transfer

## 2021-11-23 NOTE — ANESTHESIA POSTPROCEDURE EVALUATION
Patient: Cintia Fang    Procedure: * No procedures listed *       Diagnosis:* No pre-op diagnosis entered *  Diagnosis Additional Information: No value filed.    Anesthesia Type:  Epidural    Note:  Disposition: Inpatient   Postop Pain Control: Uneventful            Sign Out: Well controlled pain   PONV: No   Neuro/Psych: Uneventful            Sign Out: Acceptable/Baseline neuro status   Airway/Respiratory: Uneventful            Sign Out: Acceptable/Baseline resp. status   CV/Hemodynamics: Uneventful            Sign Out: Acceptable CV status   Other NRE: NONE   DID A NON-ROUTINE EVENT OCCUR? No    Event details/Postop Comments:    Patient doing well.  Residual neuraxial block resolved with no reported numbness or paresthesias.  Ambulating, voiding, and eating without difficulty. Denies positional headache.  Pain well controlled. No bowel or bladder changes. Minimal back discomfort at epidural site. No apparent epidural complications.  Questions encouraged and answered.             Last vitals:  Vitals Value Taken Time   BP     Temp     Pulse     Resp     SpO2         Electronically Signed By: Ehsan Zelaya MD  November 23, 2021  11:52 AM

## 2021-11-23 NOTE — PLAN OF CARE
Mother is breastfeeding, lactation helped establish a latch with mom and baby. Tolerating pain with ibuprofen.  Pt can take tylenol even though it is on her allergy list, ( in percocet).  Mother and father eager and happily bonding with baby.  Pt is independently performing perineum cares.  VSS.

## 2021-11-23 NOTE — PROGRESS NOTES
"[facility] Postpartum Note    S:  Cintia Fang feels well this morning. Was not able to sleep last night. Pain control adequate. Lochia minimal. Voiding. Breast feeding. Mood Good.     O:  Vitals were reviewed  Blood pressure 111/59, pulse 86, temperature 97.7  F (36.5  C), temperature source Oral, resp. rate 16, height 1.626 m (5' 4\"), weight 80.7 kg (178 lb), SpO2 99 %, unknown if currently breastfeeding.      General: healthy, alert and no distress  Abd: soft, appropriately tender, fundus firm  Legs: Non-tender, 1+ pitting edema    No results found for: RH  Rubella: immune  Rh negative    Assessment and Plan:   Postpartum Day #1, status post vaginal delivery, doing well.  -- Routine care  -- F/U 6 weeks w/ Primary OB  -- Discharge meds: see med rec  -- Contraception: POP    Rh negative  --rhogam as indicated    Acute blood loss anemia  --asymptomatic    Hx anxiety/depression  --stable, no meds    Holley Barrera MD    "

## 2021-11-23 NOTE — PLAN OF CARE
Up and voiding on own. Needing some assistance with breast feeding. Ibuprofen for discomfort with reported control of pain.

## 2021-11-23 NOTE — L&D DELIVERY NOTE
Cintia Fang is a 36 year old  who was admitted at 40w3d for spontaneous labor .  Pregnancy was complicated by A neg, GBS neg, anx/dep (no meds).  She underwent labor augmentation with AROM and pitocin.  AROM occurred notable for clear fluid. She progressed to fully and began pushing effectively. Pelvis was noted to be adequate.  She delivered a viable male infant with APGARs pending.  Head delivered in an DARI position, restituted to ROT and delivered without difficulty.  Left anterior shoulder delivered first, followed by right posterior shoulder without complication, and then rest of body. Spontaneous delivery of a placenta with a 3-V cord ensued shortly thereafter.  Placenta was examined and noted to be intact. She received IV pitocin as a uterotonic agent.  Exam of the perineum revealed no perineal laceration, but bilateral vaginal lacerations, the left of which was repaired in standard fashion using a 3-0 monocryl suture.  QBL 128cc.      Odilia Ibarra MD   2021, 6:34 PM     Delivery Summary    Cintia Fang MRN# [id number]   Age: 36 year old YOB: 1985          Deonte Fang [[id number]]    Labor Event Times    Labor onset date: 21 Onset time: 10:00 AM   Dilation complete date: 21 Complete time:  5:25 PM   Start pushing date/time: 2021 1747      Labor Events     labor?: No   steroids: None  Labor Type: Spontaneous, Augmentation  Predominate monitoring during 1st stage: continuous electronic fetal monitoring     Antibiotics received during labor?: No     Rupture date/time: 21 0735   Rupture type: Artificial Rupture of Membranes  Fluid color: Clear     Induction: Oxytocin, AROM  Induction date/time:     Cervical ripening date/time:        Augmentation: Oxytocin, AROM  1:1 continuous labor support provided by?: RN       Delivery/Placenta Date and Time    Delivery Date: 21 Delivery Time:  6:13 PM   Placenta Date/Time:  2021  6:16 PM  Oxytocin given at the time of delivery: after delivery of placenta  Delivering clinician: Odilia Ibarra MD   Other personnel present at delivery:  Provider Role   Migue Gauthier RN          Vaginal Counts     Initial count performed by 2 team members:  Two Team Members   Ophelia Cummings       Needles Suture Needles Sponges (RETIRED) Instruments   Initial counts 2  5    Added to count  1     Relief counts       Final counts 2 1 5          Placed during labor Accounted for at the end of labor   FSE Yes Yes   IUPC     Cervadil                Final count performed by 2 team members:  Two Team Members   Ophelia Pinzon      Final count correct?: Yes     Cord    Vessels: 3 Vessels    Cord Complications: None               Cord Blood Disposition: Lab    Gases Sent?: No    Delayed cord clamping?: Yes    Cord Clamping Delay (seconds):  seconds    Stem cell collection?: No        Resuscitation    Methods: None     Skin to Skin and Feeding Plan    Skin to skin initiation date/time: 1841    Skin to skin with: Mother  Skin to skin end date/time:        Labor Events and Shoulder Dystocia    Fetal Tracing Prior to Delivery: Category 2  Shoulder dystocia present?: Neg     Delivery (Maternal) (Provider to Complete) ([id number])    Episiotomy: None  Perineal lacerations: None    Vaginal laceration?: Yes Repaired?: Yes   Repair suture: 3-0 Monocryl  Genital tract inspection done: Pos     Blood Loss  Mother: Cintia Fang #[id number]   Start of Mother's Information    Delivery Blood Loss  21 1000 - 21 1834    Delivery QBL (mL) Hospital Encounter 128 mL    Total  128 mL         End of Mother's Information  Mother: Cintia Fang #[id number]          Delivery - Provider to Complete ([id number])    Delivering clinician: Odilia Ibarra MD  Attempted Delivery Types (Choose all that apply): Spontaneous Vaginal Delivery  Delivery Type (Choose the 1 that will go to the Birth History):  Vaginal, Spontaneous                   Other personnel:  Provider Role   Migue Gauthier RN                 Placenta    Date/Time: 11/22/2021  6:16 PM  Removal: Spontaneous  Disposition: Hospital disposal           Anesthesia    Method: INTRAVENOUS , Epidural                Presentation and Position    Presentation: Vertex    Position: Right Occiput Anterior                 Odilia Ibarra MD

## 2021-11-24 VITALS
HEIGHT: 64 IN | TEMPERATURE: 98.2 F | SYSTOLIC BLOOD PRESSURE: 117 MMHG | RESPIRATION RATE: 16 BRPM | BODY MASS INDEX: 30.39 KG/M2 | OXYGEN SATURATION: 99 % | HEART RATE: 82 BPM | DIASTOLIC BLOOD PRESSURE: 58 MMHG | WEIGHT: 178 LBS

## 2021-11-24 PROBLEM — Z36.89 ENCOUNTER FOR TRIAGE IN PREGNANT PATIENT: Status: RESOLVED | Noted: 2021-11-18 | Resolved: 2021-11-24

## 2021-11-24 PROBLEM — O99.019 ANEMIA AFFECTING PREGNANCY: Status: ACTIVE | Noted: 2021-11-24

## 2021-11-24 ASSESSMENT — ACTIVITIES OF DAILY LIVING (ADL)
ADLS_ACUITY_SCORE: 3

## 2021-11-24 NOTE — PLAN OF CARE
Data: Vital signs within normal limits. Postpartum checks within normal limits - see flow record. Patient eating and drinking normally. Patient able to empty bladder independently and is up ambulating. No apparent signs of infection. Patient performing self cares and is able to care for infant.  Action: Patient denies pain medication at this time- stated she was comfortable. Patient discharge education completed, no further questions. See flow record.  Response: Positive attachment behaviors observed with infant. Support person present.   Plan: Anticipate discharge to home with infant today.

## 2021-11-24 NOTE — PLAN OF CARE
Pt. vitals stable. Pt declined pain medications stating she was coping well, reporting mild pain. Independent in infant and personal cares. Breast feeding infant. Attentive to infant needs and bonding well with infant. Spouse supportive at bedside.

## 2021-11-24 NOTE — PROGRESS NOTES
"[facility] Postpartum Note    S:  Cintia Fang feels well this morning. Pain control adequate. Lochia minimal. Voiding. Breast feeding. Mood Good. Ambulatory.    O:  Vitals were reviewed  Blood pressure 117/47, pulse 79, temperature 98.1  F (36.7  C), temperature source Oral, resp. rate 16, height 1.626 m (5' 4\"), weight 80.7 kg (178 lb), SpO2 99 %, unknown if currently breastfeeding.      General: healthy, alert and no distress  Abd: soft, appropriately tender, fundus firm  Legs: Non-tender, 1+ pitting edema    No results found for: RH  Rubella: immune  Rh negative    Assessment and Plan:   Postpartum Day #2, status post vaginal delivery, doing well.  -- Routine care  -- F/U 6 weeks w/ Primary OB  -- Discharge meds: see med rec  -- Contraception: POP    Rh negative  -- Rh negative    Acute blood loss anemia  --asymptomatic    Hx anxiety/depression  --stable, no meds    "

## 2024-11-11 ENCOUNTER — HOSPITAL ENCOUNTER (INPATIENT)
Facility: CLINIC | Age: 39
LOS: 2 days | Discharge: HOME | End: 2024-11-13
Attending: OBSTETRICS & GYNECOLOGY | Admitting: OBSTETRICS & GYNECOLOGY
Payer: COMMERCIAL

## 2024-11-11 ENCOUNTER — ANESTHESIA EVENT (OUTPATIENT)
Dept: OBGYN | Facility: CLINIC | Age: 39
End: 2024-11-11
Payer: COMMERCIAL

## 2024-11-11 ENCOUNTER — ANESTHESIA (OUTPATIENT)
Dept: OBGYN | Facility: CLINIC | Age: 39
End: 2024-11-11
Payer: COMMERCIAL

## 2024-11-11 LAB
ABO/RH(D): NORMAL
ANTIBODY SCREEN: NEGATIVE
HGB BLD-MCNC: 12 G/DL (ref 11.7–15.7)
SPECIMEN EXPIRATION DATE: NORMAL
T PALLIDUM AB SER QL: NONREACTIVE

## 2024-11-11 PROCEDURE — 120N000013 HC R&B IMCU

## 2024-11-11 PROCEDURE — 86900 BLOOD TYPING SEROLOGIC ABO: CPT | Performed by: OBSTETRICS & GYNECOLOGY

## 2024-11-11 PROCEDURE — 10907ZC DRAINAGE OF AMNIOTIC FLUID, THERAPEUTIC FROM PRODUCTS OF CONCEPTION, VIA NATURAL OR ARTIFICIAL OPENING: ICD-10-PCS | Performed by: OBSTETRICS & GYNECOLOGY

## 2024-11-11 PROCEDURE — 258N000003 HC RX IP 258 OP 636: Performed by: OBSTETRICS & GYNECOLOGY

## 2024-11-11 PROCEDURE — 250N000011 HC RX IP 250 OP 636: Performed by: ANESTHESIOLOGY

## 2024-11-11 PROCEDURE — 250N000009 HC RX 250: Performed by: OBSTETRICS & GYNECOLOGY

## 2024-11-11 PROCEDURE — 370N000003 HC ANESTHESIA WARD SERVICE: Performed by: ANESTHESIOLOGY

## 2024-11-11 PROCEDURE — 3E0R3BZ INTRODUCTION OF ANESTHETIC AGENT INTO SPINAL CANAL, PERCUTANEOUS APPROACH: ICD-10-PCS | Performed by: ANESTHESIOLOGY

## 2024-11-11 PROCEDURE — 00HU33Z INSERTION OF INFUSION DEVICE INTO SPINAL CANAL, PERCUTANEOUS APPROACH: ICD-10-PCS | Performed by: ANESTHESIOLOGY

## 2024-11-11 PROCEDURE — 3E0E77Z INTRODUCTION OF ELECTROLYTIC AND WATER BALANCE SUBSTANCE INTO PRODUCTS OF CONCEPTION, VIA NATURAL OR ARTIFICIAL OPENING: ICD-10-PCS | Performed by: OBSTETRICS & GYNECOLOGY

## 2024-11-11 PROCEDURE — 86780 TREPONEMA PALLIDUM: CPT | Performed by: OBSTETRICS & GYNECOLOGY

## 2024-11-11 PROCEDURE — 86850 RBC ANTIBODY SCREEN: CPT | Performed by: OBSTETRICS & GYNECOLOGY

## 2024-11-11 PROCEDURE — 250N000013 HC RX MED GY IP 250 OP 250 PS 637: Performed by: OBSTETRICS & GYNECOLOGY

## 2024-11-11 PROCEDURE — 250N000011 HC RX IP 250 OP 636: Performed by: OBSTETRICS & GYNECOLOGY

## 2024-11-11 PROCEDURE — 85018 HEMOGLOBIN: CPT | Performed by: OBSTETRICS & GYNECOLOGY

## 2024-11-11 RX ORDER — OXYTOCIN/0.9 % SODIUM CHLORIDE 30/500 ML
100-340 PLASTIC BAG, INJECTION (ML) INTRAVENOUS CONTINUOUS PRN
Status: DISCONTINUED | OUTPATIENT
Start: 2024-11-11 | End: 2024-11-12

## 2024-11-11 RX ORDER — ONDANSETRON 2 MG/ML
4 INJECTION INTRAMUSCULAR; INTRAVENOUS EVERY 6 HOURS PRN
Status: DISCONTINUED | OUTPATIENT
Start: 2024-11-11 | End: 2024-11-12 | Stop reason: HOSPADM

## 2024-11-11 RX ORDER — LIDOCAINE 40 MG/G
CREAM TOPICAL
Status: DISCONTINUED | OUTPATIENT
Start: 2024-11-11 | End: 2024-11-12 | Stop reason: HOSPADM

## 2024-11-11 RX ORDER — TRANEXAMIC ACID 10 MG/ML
1 INJECTION, SOLUTION INTRAVENOUS EVERY 30 MIN PRN
Status: DISCONTINUED | OUTPATIENT
Start: 2024-11-11 | End: 2024-11-12 | Stop reason: HOSPADM

## 2024-11-11 RX ORDER — FENTANYL CITRATE 50 UG/ML
100 INJECTION, SOLUTION INTRAMUSCULAR; INTRAVENOUS
Status: DISCONTINUED | OUTPATIENT
Start: 2024-11-11 | End: 2024-11-12 | Stop reason: HOSPADM

## 2024-11-11 RX ORDER — OXYTOCIN/0.9 % SODIUM CHLORIDE 30/500 ML
1-24 PLASTIC BAG, INJECTION (ML) INTRAVENOUS CONTINUOUS
Status: DISCONTINUED | OUTPATIENT
Start: 2024-11-11 | End: 2024-11-12 | Stop reason: HOSPADM

## 2024-11-11 RX ORDER — CARBOPROST TROMETHAMINE 250 UG/ML
250 INJECTION, SOLUTION INTRAMUSCULAR
Status: DISCONTINUED | OUTPATIENT
Start: 2024-11-11 | End: 2024-11-12 | Stop reason: HOSPADM

## 2024-11-11 RX ORDER — ACETAMINOPHEN 325 MG/1
650 TABLET ORAL EVERY 4 HOURS PRN
Status: DISCONTINUED | OUTPATIENT
Start: 2024-11-11 | End: 2024-11-12 | Stop reason: HOSPADM

## 2024-11-11 RX ORDER — NALOXONE HYDROCHLORIDE 0.4 MG/ML
0.4 INJECTION, SOLUTION INTRAMUSCULAR; INTRAVENOUS; SUBCUTANEOUS
Status: DISCONTINUED | OUTPATIENT
Start: 2024-11-11 | End: 2024-11-12 | Stop reason: HOSPADM

## 2024-11-11 RX ORDER — MISOPROSTOL 200 UG/1
400 TABLET ORAL
Status: DISCONTINUED | OUTPATIENT
Start: 2024-11-11 | End: 2024-11-12 | Stop reason: HOSPADM

## 2024-11-11 RX ORDER — FENTANYL/BUPIVACAINE/NS/PF 2-1250MCG
PLASTIC BAG, INJECTION (ML) INJECTION
Status: COMPLETED
Start: 2024-11-11 | End: 2024-11-11

## 2024-11-11 RX ORDER — MISOPROSTOL 100 UG/1
25 TABLET ORAL
Status: DISCONTINUED | OUTPATIENT
Start: 2024-11-11 | End: 2024-11-12 | Stop reason: HOSPADM

## 2024-11-11 RX ORDER — PROCHLORPERAZINE MALEATE 10 MG
10 TABLET ORAL EVERY 6 HOURS PRN
Status: DISCONTINUED | OUTPATIENT
Start: 2024-11-11 | End: 2024-11-12 | Stop reason: HOSPADM

## 2024-11-11 RX ORDER — LOPERAMIDE HYDROCHLORIDE 2 MG/1
2 CAPSULE ORAL
Status: DISCONTINUED | OUTPATIENT
Start: 2024-11-11 | End: 2024-11-12 | Stop reason: HOSPADM

## 2024-11-11 RX ORDER — EPHEDRINE SULFATE 50 MG/ML
5 INJECTION, SOLUTION INTRAMUSCULAR; INTRAVENOUS; SUBCUTANEOUS
Status: DISCONTINUED | OUTPATIENT
Start: 2024-11-11 | End: 2024-11-12 | Stop reason: HOSPADM

## 2024-11-11 RX ORDER — METOCLOPRAMIDE HYDROCHLORIDE 5 MG/ML
10 INJECTION INTRAMUSCULAR; INTRAVENOUS EVERY 6 HOURS PRN
Status: DISCONTINUED | OUTPATIENT
Start: 2024-11-11 | End: 2024-11-12 | Stop reason: HOSPADM

## 2024-11-11 RX ORDER — NALOXONE HYDROCHLORIDE 0.4 MG/ML
0.2 INJECTION, SOLUTION INTRAMUSCULAR; INTRAVENOUS; SUBCUTANEOUS
Status: DISCONTINUED | OUTPATIENT
Start: 2024-11-11 | End: 2024-11-12 | Stop reason: HOSPADM

## 2024-11-11 RX ORDER — METHYLERGONOVINE MALEATE 0.2 MG/ML
200 INJECTION INTRAVENOUS
Status: DISCONTINUED | OUTPATIENT
Start: 2024-11-11 | End: 2024-11-12 | Stop reason: HOSPADM

## 2024-11-11 RX ORDER — NALBUPHINE HYDROCHLORIDE 10 MG/ML
2.5-5 INJECTION INTRAMUSCULAR; INTRAVENOUS; SUBCUTANEOUS EVERY 6 HOURS PRN
Status: DISCONTINUED | OUTPATIENT
Start: 2024-11-11 | End: 2024-11-12

## 2024-11-11 RX ORDER — KETOROLAC TROMETHAMINE 30 MG/ML
30 INJECTION, SOLUTION INTRAMUSCULAR; INTRAVENOUS
Status: COMPLETED | OUTPATIENT
Start: 2024-11-11 | End: 2024-11-12

## 2024-11-11 RX ORDER — SODIUM CHLORIDE, SODIUM LACTATE, POTASSIUM CHLORIDE, CALCIUM CHLORIDE 600; 310; 30; 20 MG/100ML; MG/100ML; MG/100ML; MG/100ML
INJECTION, SOLUTION INTRAVENOUS CONTINUOUS
Status: DISCONTINUED | OUTPATIENT
Start: 2024-11-11 | End: 2024-11-12 | Stop reason: HOSPADM

## 2024-11-11 RX ORDER — IBUPROFEN 800 MG/1
800 TABLET, FILM COATED ORAL
Status: COMPLETED | OUTPATIENT
Start: 2024-11-11 | End: 2024-11-12

## 2024-11-11 RX ORDER — OXYTOCIN/0.9 % SODIUM CHLORIDE 30/500 ML
340 PLASTIC BAG, INJECTION (ML) INTRAVENOUS CONTINUOUS PRN
Status: DISCONTINUED | OUTPATIENT
Start: 2024-11-11 | End: 2024-11-12 | Stop reason: HOSPADM

## 2024-11-11 RX ORDER — MISOPROSTOL 200 UG/1
800 TABLET ORAL
Status: DISCONTINUED | OUTPATIENT
Start: 2024-11-11 | End: 2024-11-12 | Stop reason: HOSPADM

## 2024-11-11 RX ORDER — CITRIC ACID/SODIUM CITRATE 334-500MG
30 SOLUTION, ORAL ORAL
Status: DISCONTINUED | OUTPATIENT
Start: 2024-11-11 | End: 2024-11-12 | Stop reason: HOSPADM

## 2024-11-11 RX ORDER — METOCLOPRAMIDE 10 MG/1
10 TABLET ORAL EVERY 6 HOURS PRN
Status: DISCONTINUED | OUTPATIENT
Start: 2024-11-11 | End: 2024-11-12 | Stop reason: HOSPADM

## 2024-11-11 RX ORDER — LOPERAMIDE HYDROCHLORIDE 2 MG/1
4 CAPSULE ORAL
Status: DISCONTINUED | OUTPATIENT
Start: 2024-11-11 | End: 2024-11-12 | Stop reason: HOSPADM

## 2024-11-11 RX ORDER — ONDANSETRON 4 MG/1
4 TABLET, ORALLY DISINTEGRATING ORAL EVERY 6 HOURS PRN
Status: DISCONTINUED | OUTPATIENT
Start: 2024-11-11 | End: 2024-11-12 | Stop reason: HOSPADM

## 2024-11-11 RX ORDER — BUPIVACAINE HYDROCHLORIDE 2.5 MG/ML
INJECTION, SOLUTION EPIDURAL; INFILTRATION; INTRACAUDAL
Status: COMPLETED | OUTPATIENT
Start: 2024-11-11 | End: 2024-11-11

## 2024-11-11 RX ORDER — OXYTOCIN 10 [USP'U]/ML
10 INJECTION, SOLUTION INTRAMUSCULAR; INTRAVENOUS
Status: DISCONTINUED | OUTPATIENT
Start: 2024-11-11 | End: 2024-11-12 | Stop reason: HOSPADM

## 2024-11-11 RX ORDER — OXYTOCIN 10 [USP'U]/ML
10 INJECTION, SOLUTION INTRAMUSCULAR; INTRAVENOUS
Status: DISCONTINUED | OUTPATIENT
Start: 2024-11-11 | End: 2024-11-12

## 2024-11-11 RX ORDER — HYDROXYZINE HYDROCHLORIDE 50 MG/1
50 TABLET, FILM COATED ORAL
Status: DISCONTINUED | OUTPATIENT
Start: 2024-11-11 | End: 2024-11-12 | Stop reason: HOSPADM

## 2024-11-11 RX ADMIN — SODIUM CHLORIDE, POTASSIUM CHLORIDE, SODIUM LACTATE AND CALCIUM CHLORIDE: 600; 310; 30; 20 INJECTION, SOLUTION INTRAVENOUS at 14:38

## 2024-11-11 RX ADMIN — SODIUM CHLORIDE, POTASSIUM CHLORIDE, SODIUM LACTATE AND CALCIUM CHLORIDE: 600; 310; 30; 20 INJECTION, SOLUTION INTRAVENOUS at 20:39

## 2024-11-11 RX ADMIN — BUPIVACAINE HYDROCHLORIDE 10 ML: 2.5 INJECTION, SOLUTION EPIDURAL; INFILTRATION; INTRACAUDAL at 18:53

## 2024-11-11 RX ADMIN — MISOPROSTOL 25 MCG: 100 TABLET ORAL at 09:27

## 2024-11-11 RX ADMIN — FENTANYL CITRATE 100 MCG: 0.05 INJECTION, SOLUTION INTRAMUSCULAR; INTRAVENOUS at 15:10

## 2024-11-11 RX ADMIN — Medication: at 19:17

## 2024-11-11 RX ADMIN — Medication 1 MILLI-UNITS/MIN: at 14:39

## 2024-11-11 RX ADMIN — FENTANYL CITRATE 100 MCG: 0.05 INJECTION, SOLUTION INTRAMUSCULAR; INTRAVENOUS at 16:30

## 2024-11-11 RX ADMIN — SODIUM CHLORIDE, POTASSIUM CHLORIDE, SODIUM LACTATE AND CALCIUM CHLORIDE: 600; 310; 30; 20 INJECTION, SOLUTION INTRAVENOUS at 18:21

## 2024-11-11 ASSESSMENT — ACTIVITIES OF DAILY LIVING (ADL)
ADLS_ACUITY_SCORE: 0

## 2024-11-11 NOTE — H&P
[facility]     History and Physical  Obstetrics and Gynecology     Date of Admission:  2024    History of Present Illness   Cintia Fnag is a 39 year old female  40w4d with Estimated Date of Delivery: 2024 who presents with AMA. She denies regular and painful ctx, LOF, VB, decr FM.     PRENATAL COURSE  Prenatal care was received at [facility] and the  course was complicated by: AIDE/MDD (no meds), Asthma mild and intermittent, AMA.     Prenatal Care:  - OB labs reviewed  > A neg, Rubella immune, HIV neg, RPR neg, Hep B neg, Hep C neg  > Varicella: had chickenpox as child // Measles: s/p MMR x2  > Glucola nl, Hgb, Plts, RPR   > GBS neg  > Rh negative, RhD not detected on NIPS, Rhogam not indicated  - Prenatal Screening: NIPS low risk, msAFP declined  - Anatomy Ultrasound: L2  placenta anterior, normal anatomy  - Vaccines: [declined]COVID, [declined]Influenza, [declined]Tdap, [declined]RSV  - Continue prenatal multivitamin  - Recommend Aspirin for preeclampsia prevention, patient declined  - Birth Preferences  > Mode of Delivery: anticipate VD  > Infant Feeding: breastfeeding, pump Rx provided previously  > Pediatrics: [facility]  > Postpartum Contraception: likely OCPs     Recent Labs   Lab Test 21  0122   AS Positive*     Rhogam not indicated, NIPT showed fetus Rh negative.   Recent Labs   Lab Test 10/25/21  0802   GBS Negative       Past Medical History    I have reviewed this patient's medical history and updated it with pertinent information if needed.   Past Medical History:   Diagnosis Date    Migraines     Uncomplicated asthma     use inhaler prn       Past Surgical History   I have reviewed this patient's surgical history and updated it with pertinent information if needed.  Past Surgical History:   Procedure Laterality Date    ANKLE SURGERY Left     COSMETIC SURGERY      reconstructive surgery - face  - following dog bite    TONSILLECTOMY      [facility] ORAL SURGERY PROCEDURE      wisdom teeth removed       Prior to Admission Medications   Prior to Admission Medications   Prescriptions Last Dose Informant Patient Reported? Taking?   ALBUTEROL IN Past Week  Yes Yes   Sig: Inhale 1-2 puffs into the lungs daily as needed   Prenatal Vit-Fe Fumarate-FA (PRENATAL MULTIVITAMIN W/IRON) 27-0.8 MG tablet Unknown  Yes No   Sig: Take 1 tablet by mouth daily   benzocaine (AMER[place]INE) 20 % external aerosol   No No   Sig: Apply to perineum as needed for pain   docusate sodium (COLACE) 100 MG capsule   No No   Sig: Take 1 capsule (100 mg) by mouth daily   fish oil-omega-3 fatty acids 1000 MG capsule   Yes No   Sig: Take by mouth daily   lanolin ointment   No No   Sig: Apply topically every hour as needed for other (sore nipples)      Facility-Administered Medications: None     Allergies   Allergies   Allergen Reactions    Oxycodone-Acetaminophen Nausea and Vomiting       Social History   I have reviewed this patient's social history and updated it with pertinent information if needed. Cintia GARCIA [place]ucaipa  reports that she has quit smoking. She has never used smokeless tobacco. She reports that she does not currently use alcohol. She reports that she does not currently use drugs.    Family History   I have reviewed this patient's family history and updated it with pertinent information if needed.   History reviewed. No pertinent family history.    Immunization History   As above.     Physical Exam   Temp: 98.7  F (37.1  C) Temp src: Oral BP: 112/67 Pulse: 90   Resp: 16   O2 Device: None (Room air)    Vital Signs with Ranges  Temp:  [98.7  F (37.1  C)] 98.7  F (37.1  C)  Pulse:  [90] 90  Resp:  [16] 16  BP: (112)/(67) 112/67  Fetal Heart Tones: 135 baseline, moderate variablility, + accels, no decels, and Category I  TOCO: frequency q 2-3 minutes    Constitutional: healthy, alert, and active   Respiratory: non-labored  Cardiovascular:  RR  Abdomen: gravid, single vertex fetus, non-tender, EFW 8 lbs   Cervical Exam: per RN upon presentation 1.5/50%/-2/P/S, Ibarra 5.   Skin/Extremites: normal skin color, texture, turgor  Neurologic: A&O  Neuropsychiatric: General: normal, calm, and normal eye contact    BSUS today: Cephalic  24 US: Placenta anterior      Assessment & Plan   Cintia Fang is a 39 year old female  who presents at 40w4d with AMA.   GBS negative.   NST reactive.  Category  I.     Admit - see IP orders  Admission labs  Pain meds upon request  Ibarra 5, start with PO cytotec  Rescue inhaler prn, avoid Hemabate if PPH    Irving Camejo MD

## 2024-11-11 NOTE — CARE PLAN
Pt is requesting Epidural consent signed.  1851 Paged MINH   1852 MINH returned his page and request for Epidural orders,IV fluid bolus started and explained how to sit and what to expect during the procedure , pt voiced understandings.

## 2024-11-11 NOTE — CARE PLAN
Data: Patient admitted to room 410 at 0730. Patient is a . Prenatal record reviewed.   Uncomplicated Pregnancy here for IOL due to AMA.  Pt has H/O Asthma and use Albuterol.    OB History    Para Term  AB Living   3 1 1 0 1 1   SAB IAB Ectopic Multiple Live Births   0 0 0 0 1      # Outcome Date GA Lbr Huang/2nd Weight Sex Type Anes PTL Lv   3 Current            2 Term 21 40w3d 07:25 / 00:48 3.36 kg (7 lb 6.5 oz) M Vag-Spont IV, EPI N STACEY      Name: KELLY COATS      Apgar1: 8  Apgar5: 9   1 AB            .  Medical History:   Past Medical History:   Diagnosis Date    Migraines     Uncomplicated asthma     use inhaler prn   .  Gestational age 40w4d. Vital signs per doc flowsheet. Fetal movement present. Patient reports Induction Of Labor   as reason for admission. Support persons is  present.  Action:  Verbal consent for EFM, external fetal monitors applied. Admission assessment completed. Patient and support persons educated on labor process. Patient instructed to report change in fetal movement, contractions, vaginal leaking of fluid or bleeding, abdominal pain, or any concerns related to the pregnancy to her nurse/physician. Patient oriented to room, call light in reach.   Response: Dr. Camejo  informed of Pt arrival, and SVE Ibarra is 5, CX is Posterior soft. Plan per provider is to start with PO Cytotec Vaginal and Labor order received. Patient verbalized understanding of education and verbalized agreement with plan.  Pain options discussed with Pt;IV and Epidural.

## 2024-11-12 PROCEDURE — 0UQMXZZ REPAIR VULVA, EXTERNAL APPROACH: ICD-10-PCS | Performed by: OBSTETRICS & GYNECOLOGY

## 2024-11-12 PROCEDURE — 722N000001 HC LABOR CARE VAGINAL DELIVERY SINGLE

## 2024-11-12 PROCEDURE — 250N000011 HC RX IP 250 OP 636: Performed by: ANESTHESIOLOGY

## 2024-11-12 PROCEDURE — 250N000009 HC RX 250: Performed by: OBSTETRICS & GYNECOLOGY

## 2024-11-12 PROCEDURE — 250N000011 HC RX IP 250 OP 636: Performed by: OBSTETRICS & GYNECOLOGY

## 2024-11-12 PROCEDURE — 258N000003 HC RX IP 258 OP 636: Performed by: OBSTETRICS & GYNECOLOGY

## 2024-11-12 PROCEDURE — 120N000013 HC R&B IMCU

## 2024-11-12 RX ORDER — HYDROCORTISONE 25 MG/G
CREAM TOPICAL 3 TIMES DAILY PRN
Status: DISCONTINUED | OUTPATIENT
Start: 2024-11-12 | End: 2024-11-13 | Stop reason: HOSPADM

## 2024-11-12 RX ORDER — OXYTOCIN/0.9 % SODIUM CHLORIDE 30/500 ML
340 PLASTIC BAG, INJECTION (ML) INTRAVENOUS CONTINUOUS PRN
Status: DISCONTINUED | OUTPATIENT
Start: 2024-11-12 | End: 2024-11-13 | Stop reason: HOSPADM

## 2024-11-12 RX ORDER — SODIUM CHLORIDE 9 MG/ML
INJECTION, SOLUTION INTRAVENOUS CONTINUOUS
Status: DISCONTINUED | OUTPATIENT
Start: 2024-11-12 | End: 2024-11-12 | Stop reason: HOSPADM

## 2024-11-12 RX ORDER — LOPERAMIDE HYDROCHLORIDE 2 MG/1
4 CAPSULE ORAL
Status: DISCONTINUED | OUTPATIENT
Start: 2024-11-12 | End: 2024-11-13 | Stop reason: HOSPADM

## 2024-11-12 RX ORDER — TRANEXAMIC ACID 10 MG/ML
1 INJECTION, SOLUTION INTRAVENOUS EVERY 30 MIN PRN
Status: DISCONTINUED | OUTPATIENT
Start: 2024-11-12 | End: 2024-11-13 | Stop reason: HOSPADM

## 2024-11-12 RX ORDER — DOCUSATE SODIUM 100 MG/1
100 CAPSULE, LIQUID FILLED ORAL DAILY
Status: DISCONTINUED | OUTPATIENT
Start: 2024-11-12 | End: 2024-11-13 | Stop reason: HOSPADM

## 2024-11-12 RX ORDER — CARBOPROST TROMETHAMINE 250 UG/ML
250 INJECTION, SOLUTION INTRAMUSCULAR
Status: DISCONTINUED | OUTPATIENT
Start: 2024-11-12 | End: 2024-11-13 | Stop reason: HOSPADM

## 2024-11-12 RX ORDER — MISOPROSTOL 200 UG/1
400 TABLET ORAL
Status: DISCONTINUED | OUTPATIENT
Start: 2024-11-12 | End: 2024-11-13 | Stop reason: HOSPADM

## 2024-11-12 RX ORDER — METHYLERGONOVINE MALEATE 0.2 MG/ML
200 INJECTION INTRAVENOUS
Status: DISCONTINUED | OUTPATIENT
Start: 2024-11-12 | End: 2024-11-13 | Stop reason: HOSPADM

## 2024-11-12 RX ORDER — BISACODYL 10 MG
10 SUPPOSITORY, RECTAL RECTAL DAILY PRN
Status: DISCONTINUED | OUTPATIENT
Start: 2024-11-12 | End: 2024-11-13 | Stop reason: HOSPADM

## 2024-11-12 RX ORDER — PANTOPRAZOLE SODIUM 40 MG/1
40 TABLET, DELAYED RELEASE ORAL
Status: DISCONTINUED | OUTPATIENT
Start: 2024-11-13 | End: 2024-11-13 | Stop reason: HOSPADM

## 2024-11-12 RX ORDER — LOPERAMIDE HYDROCHLORIDE 2 MG/1
2 CAPSULE ORAL
Status: DISCONTINUED | OUTPATIENT
Start: 2024-11-12 | End: 2024-11-13 | Stop reason: HOSPADM

## 2024-11-12 RX ORDER — MODIFIED LANOLIN
OINTMENT (GRAM) TOPICAL
Status: DISCONTINUED | OUTPATIENT
Start: 2024-11-12 | End: 2024-11-13 | Stop reason: HOSPADM

## 2024-11-12 RX ORDER — BUPIVACAINE HYDROCHLORIDE 2.5 MG/ML
INJECTION, SOLUTION EPIDURAL; INFILTRATION; INTRACAUDAL PRN
Status: DISCONTINUED | OUTPATIENT
Start: 2024-11-12 | End: 2024-11-12

## 2024-11-12 RX ORDER — IBUPROFEN 800 MG/1
800 TABLET, FILM COATED ORAL EVERY 6 HOURS PRN
Status: DISCONTINUED | OUTPATIENT
Start: 2024-11-12 | End: 2024-11-13 | Stop reason: HOSPADM

## 2024-11-12 RX ORDER — MISOPROSTOL 200 UG/1
800 TABLET ORAL
Status: DISCONTINUED | OUTPATIENT
Start: 2024-11-12 | End: 2024-11-13 | Stop reason: HOSPADM

## 2024-11-12 RX ORDER — ACETAMINOPHEN 325 MG/1
650 TABLET ORAL EVERY 4 HOURS PRN
Status: DISCONTINUED | OUTPATIENT
Start: 2024-11-12 | End: 2024-11-13 | Stop reason: HOSPADM

## 2024-11-12 RX ORDER — OXYTOCIN 10 [USP'U]/ML
10 INJECTION, SOLUTION INTRAMUSCULAR; INTRAVENOUS
Status: DISCONTINUED | OUTPATIENT
Start: 2024-11-12 | End: 2024-11-13 | Stop reason: HOSPADM

## 2024-11-12 RX ADMIN — KETOROLAC TROMETHAMINE 30 MG: 30 INJECTION, SOLUTION INTRAMUSCULAR at 09:24

## 2024-11-12 RX ADMIN — SODIUM CHLORIDE, POTASSIUM CHLORIDE, SODIUM LACTATE AND CALCIUM CHLORIDE: 600; 310; 30; 20 INJECTION, SOLUTION INTRAVENOUS at 01:00

## 2024-11-12 RX ADMIN — SODIUM CHLORIDE 250 ML: 9 INJECTION, SOLUTION INTRAVENOUS at 01:03

## 2024-11-12 RX ADMIN — Medication: at 03:12

## 2024-11-12 RX ADMIN — BUPIVACAINE HYDROCHLORIDE 8 ML: 2.5 INJECTION, SOLUTION EPIDURAL; INFILTRATION; INTRACAUDAL at 00:38

## 2024-11-12 RX ADMIN — PANTOPRAZOLE SODIUM 40 MG: 40 INJECTION, POWDER, FOR SOLUTION INTRAVENOUS at 03:12

## 2024-11-12 RX ADMIN — METOCLOPRAMIDE HYDROCHLORIDE 10 MG: 5 INJECTION INTRAMUSCULAR; INTRAVENOUS at 06:08

## 2024-11-12 NOTE — PROVIDER NOTIFICATION
11/12/24 0003   Provider Notification   Provider Name/Title MD Camejo   Method of Notification At Bedside     Provider at bedside for AROM. AROM clear fluids, FSE and IUPC placed at this time as well. FHT reviewed with MD, possibility of starting an amnioinfusion if variables do not resolve. Pt c/o heartburn, provider to place orders of PPI.     Will update provider on FHT.

## 2024-11-12 NOTE — L&D DELIVERY NOTE
Cintia Fang is a 39 year old  who was admitted at 40w4d for scheduled IOL for AMA and late term gestation.  Pregnancy was complicated by AMA, class 1 obesity, H/o MDD/AIDE not on meds, asthma.  The patient was initially admitted for scheduled induction, her admission exam was 1.5/50/-2 with a Ibarra score of 5.  Given this, she was given oral misoprostol for ripening x1 dose.  Shortly thereafter, she had onset of contractions with additional pitocin augmentation started at 1439.  At 1810, she was 4 cm and received an uncomplicated epidural for labor analgesia. At 1945, with rate at 4 mu/min, she had recurrent decels and the pitocin was stopped. Resuscitative measures taken. Pitocin was restarted at 2335.  AROM, and internal monitors were placed at 0010.  Pitocin was kept at 2 mu/min given variable decelerations without adequate MVUs.  Amnioinfusion was started for variable decelerations which resolved.   At 0401, SVE was 6 cm when pitocin was kept off per discussion between the patient on in service provider; see note for further discussion.  In summary, the patient desired to rest to gather her energy. With the new oncoming day team, Pitocin was restarted at 0735 at 2 mu/min and increased to 4 mu/min at 0807.  Now with increasing pressure.  SVE completed and she was 8 cm with edematous anterior cervix.  Contraction pattern was then quickly noted to be every 2 minutes with increasing pressure and 0 station.  News was provided to the patient who was reassured. She was noted to be complete at 0845.  She pushed effectively over two contractions, and delivered a viable male infant at 0848 with APGARs of 8 and 9 respectively.  Spontaneously delivery of an intact placenta with a 3-V cord ensued shortly thereafter.  She received 30 units of IV pitocin as a uterotonic agent.  Exam of the perineum revealed bilateral periurethral lacerations which the right was repaired in standard running fashion using a 4-0  Vicryl suture.  The left was hemostatic.  She did have superficial non-bleeding lacerations to the posterior perineum as well as left of the clitoris. QBL 150cc.      Josephine Rojas MD   Pager: [phone]   November 12, 2024    Delivery Summary    Cintia Fang MRN# [id number]   Age: 39 year old YOB: 1985          Yuliana Fang-Cintia [[id number]]      Labor Event Times      Latent labor onset date/time: 11/11/2024 1600    Active labor onset date: 11/12/24 Onset time:  4:01 AM   Start pushing date/time: 11/12/2024 0847          Labor Length      3rd Stage (hrs): 0 (min): 2          Rupture date/time: 11/12/24 0010   Rupture type: Artificial Rupture of Membranes  Fluid color: Clear  Fluid odor: Normal     Induction: Misoprostol  Induction date/time:      Cervical ripening date/time:      Indications for induction: Advanced Maternal Age, Post-term Gestation     Augmentation: AROM, Oxytocin  Indications for augmentation: Ineffective Contraction Pattern       Delivery/Placenta Date and Time      Delivery Date: 11/12/24 Delivery Time:  8:48 AM   Placenta Date/Time: 11/12/2024  8:51 AM  Oxytocin given at the time of delivery: after delivery of baby   Other personnel present at delivery:  Provider Role   Bardai, Rozina R, RN Delivery Nurse   Josephine Rodrigues MD Obstetrician             Vaginal Counts       Initial count performed by 2 team members:  Two Team Members   Rozina B Oneil B         Needles Suture Needles Sponges (RETIRED) Instruments   Initial counts 2  5    Added to count  2     Relief counts       Final counts 2 2 5            Placed during labor Accounted for at the end of labor   FSE Yes Yes   IUPC Yes Yes   Cervidil No NA                  Final count performed by 2 team members:  Two Team Members   Dr. Ravi Sky RN      Final count correct?: Yes       Apgars    Living status: Living   1 Minute 5 Minute 10 Minute 15 Minute 20 Minute   Skin color:        Heart rate:       "  Reflex irritability:        Muscle tone:        Respiratory effort:        Total:        Apgars assigned by: ROZINA RN       Cord      Vessels: 3 Vessels    Cord Complications: None               Cord Blood Disposition: Lab    Gases Sent?: No    Delayed cord clamping?: Yes    Cord Clamping Delay (seconds):  seconds    Stem cell collection?: No           [place] Measurements      Weight: 7 lb 8.3 oz Length: 1' 8\"     Head circumference: 34.3 cm           Skin to Skin and Feeding Plan      Skin to skin initiation date/time: 1841      Skin to skin end date/time:            Labor Events and Shoulder Dystocia    Fetal Tracing Prior to Delivery: Category 2  Shoulder dystocia present?: Neg       Delivery (Maternal) (Provider to Complete) (852544)    Episiotomy: None  Perineal lacerations: None      Periurethral laceration: bilateral Repaired?: Yes   Repair suture: 4-0 Vicryl  Genital tract inspection done: Pos       Blood Loss  Mother: Cintia Fang R #[id number]     Start of Mother's Information      Delivery Blood Loss   Intrapartum & Postpartum: 24 0401 - 24 0914    Delivery Admission: 24 0713 - 24 0914         Intrapartum & Postpartum Delivery Admission    Delivery QBL (mL) Hospital Encounter 150 mL 150 mL    Total  150 mL 150 mL               End of Mother's Information  Mother: Cintia Fang R #[id number]                Delivery - Provider to Complete (807236)    Delivery Type (Choose the 1 that will go to the Birth History): Vaginal, Spontaneous                         Other personnel:  Provider Role   Bardai, Rozina R, RN Delivery Nurse   Josephine Rodrigues MD Obstetrician                    Placenta    Date/Time: 2024  8:51 AM  Removal: Expressed  Disposition: Hospital disposal             Anesthesia    Method: Epidural                    Presentation and Position    Presentation: Vertex    Position: Right Occiput Anterior                     Josephine Rodrigues MD "

## 2024-11-12 NOTE — PROGRESS NOTES
[facility]  Labor Progress Note    S: Patient has a window after epidural where she reports pain especially with contractions. She feels like she got some rest recently but feels like it was not very beneficial due to pain. She is ok with AROM and internals after discussion of r/b/a. Pt c/o of GERD and requests medication.     O:   Patient Vitals for the past 4 hrs:   BP Temp Temp src Resp SpO2   24 118/69 -- -- -- --   24 -- -- -- -- 99 %   24 -- -- -- -- 100 %   24 -- 97.7  F (36.5  C) Oral 18 --   24 -- -- -- -- 100 %   24 -- -- -- -- 98 %   24 116/62 -- -- -- 99 %   24 -- -- -- -- 99 %   24 -- -- -- -- 99 %   24 -- -- -- -- 98 %   24 -- -- -- -- 98 %   24 113/59 -- -- -- 99 %   24 117/63 -- -- -- 98 %   24 119/59 -- -- -- 98 %   24 -- -- -- -- 100 %   24 123/65 -- -- -- 98 %   24 118/67 -- -- -- 98 %   24 -- -- -- -- 99 %   24 121/69 -- -- -- 99 %   24 -- -- -- -- 99 %       SVE: 4-5/90/-2, head well applied; AROM with clear fluid, IUPC placed posteriorly w/o resistance, FSE placed.     FHT: 110/mod/+a/+d noted  [place]: Ctx q3-4min    A/P:  Ms. Cintia Fang is a 39 year old  at 40w5d here for IOL.    Labor:   - s/p PO cytotec  - s/p epidural  - Pitocin restart  - AROM approx 00:10  - IUPC and FSE placed 00:10  - If recurrent variables again, will start amnioinfusion     FWB:   - Category 2 FHT    PNC:   - Rh A neg, Rubella immune, GBS negative, GCT passed, Placenta anterior    Other:   - GERD > PPI ordered

## 2024-11-12 NOTE — PROVIDER NOTIFICATION
11/12/24 0045   Provider Notification   Provider Name/Title Dr. Camejo   Method of Notification Phone   Request Evaluate - Remote   Notification Reason Status Update       Notified MD of recurrent deep variable decelerations, RN doing intrauterine resuscitation measures including pitocin turned off, FHT still with decelerations but not as deep.    MD will place orders for amnioinfusion.

## 2024-11-12 NOTE — DISCHARGE SUMMARY
[facility] Discharge Summary    Cintia Fang MRN# [id number]   Age: 39 year old YOB: 1985     Date of Admission:  2024  Date of Discharge::  2024  Admitting Physician:  Irving Camejo MD  Discharge Physician:  Holley Barrera MD          Admission Diagnoses:   IUP at 40w4d  AMA  Late term gestation  H/o MDD/AIDE  Asthma          Discharge Diagnosis:     IUP at 40w5d, now delivered          Procedures:     Procedure(s):   Epidural           Medications Prior to Admission:     Medications Prior to Admission   Medication Sig Dispense Refill Last Dose/Taking    ALBUTEROL IN Inhale 1-2 puffs into the lungs daily as needed   Past Week    benzocaine (AMERICAINE) 20 % external aerosol Apply to perineum as needed for pain 57 g 0     docusate sodium (COLACE) 100 MG capsule Take 1 capsule (100 mg) by mouth daily 30 capsule 0     fish oil-omega-3 fatty acids 1000 MG capsule Take by mouth daily       lanolin ointment Apply topically every hour as needed for other (sore nipples) 7 g 1     Prenatal Vit-Fe Fumarate-FA (PRENATAL MULTIVITAMIN W/IRON) 27-0.8 MG tablet Take 1 tablet by mouth daily   Unknown             Discharge Medications:        Review of your medicines        UNREVIEWED medicines. Ask your doctor about these medicines        Dose / Directions   ALBUTEROL IN      Dose: 1-2 puff  Inhale 1-2 puffs into the lungs daily as needed  Refills: 0     benzocaine 20 % external aerosol  Commonly known as: AMERICAINE  Used for: Single liveborn infant delivered vaginally      Apply to perineum as needed for pain  Quantity: 57 g  Refills: 0     docusate sodium 100 MG capsule  Commonly known as: COLACE  Used for: Single liveborn infant delivered vaginally      Dose: 100 mg  Take 1 capsule (100 mg) by mouth daily  Quantity: 30 capsule  Refills: 0     prenatal multivitamin w/iron 27-0.8 MG tablet      Dose: 1 tablet  Take 1 tablet by mouth daily  Refills: 0            CONTINUE these  medicines which have NOT CHANGED        Dose / Directions   fish oil-omega-3 fatty acids 1000 MG capsule      Take by mouth daily  Refills: 0     lanolin ointment  Used for: Single liveborn infant delivered vaginally      Apply topically every hour as needed for other (sore nipples)  Quantity: 7 g  Refills: 1                     Consultations:   Lactation           Brief Admission History and Intrapartum Course:   Cintia Fang is a 39 year old  who was admitted at 40w4d for scheduled IOL for AMA and late term gestation.  Pregnancy was complicated by AMA, class 1 obesity, H/o MDD/AIDE not on meds, asthma.  The patient was initially admitted for scheduled induction, her admission exam was 1.5/50/-2 with a Ibarra score of 5.  Given this, she was given oral misoprostol for ripening x1 dose.  Shortly thereafter, she had onset of contractions with additional pitocin augmentation started at 1439.  At 1810, she was 4 cm and received an uncomplicated epidural for labor analgesia. At 1945, with rate at 4 mu/min, she had recurrent decels and the pitocin was stopped. Resuscitative measures taken. Pitocin was restarted at 2335.  AROM, and internal monitors were placed at 0010.  Pitocin was kept at 2 mu/min given variable decelerations without adequate MVUs.  Amnioinfusion was started for variable decelerations which resolved.   At 0401, SVE was 6 cm when pitocin was kept off per discussion between the patient on in service provider; see note for further discussion.  In summary, the patient desired to rest to gather her energy. With the new  day team, Pitocin was restarted at 0735 at 2 mu/min and increased to 4 mu/min at 0807.  Now with increasing pressure.  SVE completed and she was 8 cm with edematous anterior cervix.  Contraction pattern was then quickly noted to be every 2 minutes with increasing pressure and 0 station.  News was provided to the patient who was reassured. She was noted to be complete at  0845.  She pushed effectively over two contractions, and delivered a viable male infant at 0848 with APGARs of 8 and 9 respectively.  Spontaneously delivery of an intact placenta with a 3-V cord ensued shortly thereafter.  She received 30 units of IV pitocin as a uterotonic agent.  Exam of the perineum revealed bilateral periurethral lacerations which the right was repaired in standard running fashion using a 4-0 Vicryl suture.  The left was hemostatic.  She did have superficial non-bleeding lacerations to the posterior perineum as well as left of the clitoris. QBL 150cc.      Delivery Summary    Cintia Fang MRN# [id number]   Age: 39 year old YOB: 1985          Yuliana Fang-Cintia [[id number]]      Labor Event Times      Latent labor onset date/time: 11/11/2024 1600    Active labor onset date: 11/12/24 Onset time:  4:01 AM   Start pushing date/time: 11/12/2024 0847          Labor Length      3rd Stage (hrs): 0 (min): 2          Rupture date/time: 11/12/24 0010   Rupture type: Artificial Rupture of Membranes  Fluid color: Clear  Fluid odor: Normal     Induction: Misoprostol  Induction date/time:      Cervical ripening date/time:      Indications for induction: Advanced Maternal Age, Post-term Gestation     Augmentation: AROM, Oxytocin  Indications for augmentation: Ineffective Contraction Pattern       Delivery/Placenta Date and Time      Delivery Date: 11/12/24 Delivery Time:  8:48 AM   Placenta Date/Time: 11/12/2024  8:51 AM  Oxytocin given at the time of delivery: after delivery of baby   Other personnel present at delivery:  Provider Role   Bardai, Rozina R, RN Delivery Nurse   Josephine Rodrigues MD Obstetrician             Vaginal Counts       Initial count performed by 2 team members:  Two Team Members   Rozina B Oneil B         Needles Suture Needles Sponges (RETIRED) Instruments   Initial counts 2  5    Added to count  2     Relief counts       Final counts 2 2 5            Placed  "during labor Accounted for at the end of labor   FSE Yes Yes   IUPC Yes Yes   Cervidil No NA                  Final count performed by 2 team members:  Two Team Members   Dr. Ravi Sky RN      Final count correct?: Yes       Apgars    Living status: Living   1 Minute 5 Minute 10 Minute 15 Minute 20 Minute   Skin color:        Heart rate:        Reflex irritability:        Muscle tone:        Respiratory effort:        Total:        Apgars assigned by: ROZINA RN       Cord      Vessels: 3 Vessels    Cord Complications: None               Cord Blood Disposition: Lab    Gases Sent?: No    Delayed cord clamping?: Yes    Cord Clamping Delay (seconds):  seconds    Stem cell collection?: No           [place] Measurements      Weight: 7 lb 8.3 oz Length: 1' 8\"     Head circumference: 34.3 cm           Skin to Skin and Feeding Plan      Skin to skin initiation date/time: 1841      Skin to skin end date/time:            Labor Events and Shoulder Dystocia    Fetal Tracing Prior to Delivery: Category 2  Shoulder dystocia present?: Neg       Delivery (Maternal) (Provider to Complete) (621030)    Episiotomy: None  Perineal lacerations: None      Periurethral laceration: bilateral Repaired?: Yes   Repair suture: 4-0 Vicryl  Genital tract inspection done: Pos       Blood Loss  Mother: Annalisa Cintia GARCIA #[id number]     Start of Mother's Information      Delivery Blood Loss   Intrapartum & Postpartum: 24 0401 - 24 0915    Delivery Admission: 24 0713 - 24 0915         Intrapartum & Postpartum Delivery Admission    Delivery QBL (mL) Hospital Encounter 150 mL 150 mL    Total  150 mL 150 mL               End of Mother's Information  Mother: AnnalisaCintia #[id number]                Delivery - Provider to Complete (721921)    Delivery Type (Choose the 1 that will go to the Birth History): Vaginal, Spontaneous                         Other personnel:  Provider Role   Bardai, Rozina R, RN " Delivery Nurse   Josephine Rodrigues MD Obstetrician                    Placenta    Date/Time: 11/12/2024  8:51 AM  Removal: Expressed  Disposition: Hospital disposal             Anesthesia    Method: Epidural                    Presentation and Position    Presentation: Vertex    Position: Right Occiput Anterior                            Post-partum Course:   The patient's hospital course was unremarkable.  On discharge, her pain was well controlled. Vaginal bleeding is similar to peak menstrual flow.  Voiding without difficulty.  Ambulating well and tolerating a normal diet.  No fever.  Breastfeeding well.  Infant is stable.  She was discharged on post-partum day #1.    Post-partum hemoglobin: 12.0          Discharge Instructions and Follow-Up:     Discharge diet: Regular   Discharge activity: Pelvic rest for 6 weeks including no sexual intercourse, tampons, or douching.    Discharge follow-up: Follow up with your primary OB for a routine postpartum visit in 6 weeks           Discharge Disposition:     Discharged to home      Holley Barrera MD on 11/13/2024 at 9:34 AM      Josephine Rojas MD   Pager: [phone]   November 12, 2024

## 2024-11-12 NOTE — PLAN OF CARE
Goal Outcome Evaluation:      Plan of Care Reviewed With: patient, spouse    Overall Patient Progress: improvingOverall Patient Progress: improving  Data: Pt up to BR with out any concerns/problems,voided large amount Sammi cares done with minimum assistance.   Patient transferred to 429 via wheelchair at 1150. Baby transferred via parent's arms.  Action: Receiving unit notified of transfer: Yes. Patient and family notified of room change. Report given to Cathryn PHOENIX RN at 1200. Belongings sent to receiving unit. Accompanied by Registered Nurse. Oriented patient to surroundings. Call light within reach. ID bands double-checked with receiving RN.  Response: Patient tolerated transfer and is stable.    Problem: Adult Inpatient Plan of Care  Goal: Plan of Care Review  Outcome: Progressing  Flowsheets (Taken 11/12/2024 1150)  Plan of Care Reviewed With:   patient   spouse  Overall Patient Progress: improving  Goal: Patient-Specific Goal (Individualized)  Outcome: Progressing  Goal: Absence of Hospital-Acquired Illness or Injury  Outcome: Progressing  Goal: Optimal Comfort and Wellbeing  Outcome: Progressing  Intervention: Provide Person-Centered Care  Recent Flowsheet Documentation  Taken 11/12/2024 0735 by Bardai, Rozina R, RN  Trust Relationship/Rapport:   care explained   emotional support provided   questions encouraged   reassurance provided   thoughts/feelings acknowledged  Goal: Readiness for Transition of Care  Outcome: Progressing     Problem: Postpartum (Vaginal Delivery)  Goal: Successful Parent Role Transition  Outcome: Progressing  Goal: Hemostasis  Outcome: Progressing  Goal: Absence of Infection Signs and Symptoms  Outcome: Progressing  Goal: Anesthesia/Sedation Recovery  Outcome: Progressing  Goal: Optimal Pain Control and Function  Outcome: Progressing  Goal: Effective Urinary Elimination  Outcome: Progressing

## 2024-11-12 NOTE — PROVIDER NOTIFICATION
11/11/24 2012   Provider Notification   Provider Name/Title MD Camejo   Method of Notification At Bedside   Notification Reason Decels     Provider at bedside for decels. Pt just got epidural an hour ago, starting to feel comfortable. FHT reveiwed, reccurent decels that are audible, pt repositioned several times and IVFB started. SVE 5/70/-2 per RN. Baby recovering to baseline with moderate variability after each decel. BPs WNL. Pitocin still infusing, 4mu.     Will discontinue pitocin if indicated per protocol. Writer to update MD when decels resolve to AROM.

## 2024-11-12 NOTE — PROVIDER NOTIFICATION
11/12/24 0206   Provider Notification   Provider Name/Title MD Camejo   Method of Notification Electronic Page   Request Evaluate - Remote   Notification Reason Status Update     Updated provider on T. Variables have resolved since amnioinfusion began, moderate variability presented with slight periods of minimal. Intermittent lates. Ctx frequency is q 6-7 min. MVUs not calculated at this time due to ctx infrequency. Pitocin not infusing.    TORB to titrate pitocin when indicated

## 2024-11-12 NOTE — PROVIDER NOTIFICATION
11/12/24 0037   Provider Notification   Provider Name/Title MINH Ugalde   Method of Notification At Bedside   Notification Reason Pain     MDA at bedside for vivian

## 2024-11-12 NOTE — ANESTHESIA POSTPROCEDURE EVALUATION
Patient: Cintia Fang    Procedure: * No procedures listed *       Anesthesia Type:  Epidural    Note:  Disposition: Inpatient   Postop Pain Control:    PONV:    Neuro/Psych:    Airway/Respiratory:    CV/Hemodynamics:    Other NRE:    DID A NON-ROUTINE EVENT OCCUR? No    Event details/Postop Comments:  I or my partner was immediately available for management of this patient during epidural analgesia infusion.   Patient post labor epidural catheter, doing well.  She reports good pain relief with epidural catheter.  She denies ongoing sensorimotor block, headache, fever, chills or other complaints.  All questions answered, understanding voiced.  She will have us contacted for any questions or problems.           Last vitals:  Vitals:    11/12/24 1021 11/12/24 1030 11/12/24 1045   BP: 119/60 118/58 108/55   Pulse:      Resp:      Temp:      SpO2:          Electronically Signed By: Osmin Foster MD  November 12, 2024  11:44 AM

## 2024-11-12 NOTE — PROGRESS NOTES
[facility]  Labor Progress Note    S: Patient is getting more uncomfortable with contractions.    O:   Patient Vitals for the past 4 hrs:   BP Temp Temp src Resp SpO2   24 0619 -- 97.9  F (36.6  C) Oral 18 --   24 0612 119/65 -- -- -- --   24 0514 -- -- -- -- 100 %   24 0511 121/71 -- -- -- --   24 0435 -- 98.1  F (36.7  C) Oral -- --   24 0411 120/72 -- -- -- 100 %   24 0345 118/58 -- -- -- 99 %     Cervical Exam Data  Exam Time Cervical Dilation (cm) Fetal Station   0401 6 -2   24 2332 5.5 -2   24 2004 5 -2   24 1840 4 --   24 1810 4 -2   24 1347 2.5 --   24 0921 -- -2   24 0920 1 --   24 0816 -- -2     Membranes: AROM, clear with internal IUPC and FSE placed at 0010    FHT: Baseline 120, moderate variability, accelerations absent, early and rare variable decelerations  [place]: Contractions every 2-4 minutes     A/P:  Ms. Cintia Fang is a 39 year old  at 40w5d, here following IOL for AMA and unstable lie.    Labor:   -The patient was initially admitted for scheduled induction, her admission exam was 1.5/50/-2 with a Ibarra score of 5.  Given this, she was given oral misoprostol for ripening x1 dose.  Shortly thereafter, she had onset of contractions with additional pitocin augmentation started at 1439.  At 1810, she was 4 cm and received an uncomplicated epidural for labor analgesia. At 1945, with rate at 4 mu/min, she had recurrent decels and the pitocin was stopped. Resuscitative measures taken. Pitocin was restarted at 2335.  AROM, and internal monitors were placed at 0010.  Pitocin was kept at 2 mu/min given variable decelerations without adequate MVUs.  Amnioinfusion was started for variable decelerations which resolved.   At 0401, SVE was 6 cm when pitocin was kept off per discussion between the patient on in service provider; see note for further discussion.   - Pitocin was restarted at 0735 at  2 mu/min and increased to 4 mu/min at 0807.  Now with increasing pressure.  SVE completed and she was 8 cm with edematous anterior cervix.  Contraction pattern was then quickly noted to be every 2 minutes with increasing pressure and 0 station.  News was provided to the patient who was reassured.    - H/o  x1, pelvis proven to 7# 7oz   - Pain: Comfortable with epidural      FWB:   - Category II FHT    Prenatal Care:  > A neg, Rubella immune, HIV neg, RPR neg, Hep B neg, Hep C neg  > Varicella: had chickenpox as child // Measles: s/p MMR x2  > Glucola nl, Hgb, Plts, RPR   > GBS neg  > Rh negative, RhD not detected on NIPS, Rhogam not indicated  - Prenatal Screening: NIPS low risk, msAFP declined  - Anatomy Ultrasound: L2  placenta anterior, normal anatomy  - Vaccines: [declined]COVID, [declined]Influenza, [declined]Tdap, [declined]RSV  - Recommend Aspirin for preeclampsia prevention, patient declined  > Infant Feeding: breastfeeding, pump Rx provided previously  > Pediatrics: [facility] [place]  > Postpartum Contraception: likely OCPs     Advanced Maternal Age, >=35:   - On , EFW 31% AC 46%    Anxiety, Depression:   - No current medications. Mood stable, no SI/HI.   - Close attention to mood in the postpartum period.     Asthma:   - Avoid Hemabate    Josephine Rojas MD   Pager: [phone]   2024

## 2024-11-12 NOTE — PROVIDER NOTIFICATION
11/12/24 0501   Provider Notification   Provider Name/Title MD Camejo   Method of Notification At Bedside     MD requested by patient to come to bedside to discuss POC, patient possibly desiring c/s. All questions and concerns addressed. See provider note for details.

## 2024-11-12 NOTE — ANESTHESIA PREPROCEDURE EVALUATION
"Anesthesia Pre-Procedure Evaluation    Patient: Cintia Fang   MRN: [id number] : 1985        Procedure :           Past Medical History:   Diagnosis Date    Migraines     Uncomplicated asthma     use inhaler prn      Past Surgical History:   Procedure Laterality Date    ANKLE SURGERY Left     COSMETIC SURGERY      reconstructive surgery - face - following dog bite    TONSILLECTOMY      ZZC ORAL SURGERY PROCEDURE      wisdom teeth removed      Allergies   Allergen Reactions    Oxycodone-Acetaminophen Nausea and Vomiting      Social History     Tobacco Use    Smoking status: Former    Smokeless tobacco: Never   Substance Use Topics    Alcohol use: Not Currently      Wt Readings from Last 1 Encounters:   24 82.1 kg (181 lb)        Anesthesia Evaluation   Pt has had prior anesthetic. Type: OB Labor Epidural.    No history of anesthetic complications       ROS/MED HX  ENT/Pulmonary:     (+)                      asthma                  Neurologic:  - neg neurologic ROS     Cardiovascular:  - neg cardiovascular ROS     METS/Exercise Tolerance:     Hematologic:       Musculoskeletal:       GI/Hepatic:       Renal/Genitourinary:       Endo:     (+)               Obesity,       Psychiatric/Substance Use:       Infectious Disease:       Malignancy:       Other:    presenting for eIOL for post dates 40+4            OUTSIDE LABS:  CBC:   Lab Results   Component Value Date    HGB 12.0 2024    HGB 10.1 (L) 2021     BMP: No results found for: \"NA\", \"POTASSIUM\", \"CHLORIDE\", \"CO2\", \"BUN\", \"CR\", \"GLC\"  COAGS: No results found for: \"PTT\", \"INR\", \"FIBR\"  POC: No results found for: \"BGM\", \"HCG\", \"HCGS\"  HEPATIC: No results found for: \"ALBUMIN\", \"PROTTOTAL\", \"ALT\", \"AST\", \"GGT\", \"ALKPHOS\", \"BILITOTAL\", \"BILIDIRECT\", \"YULIYA\"  OTHER: No results found for: \"PH\", \"LACT\", \"A1C\", \"SAUMYA\", \"PHOS\", \"MAG\", \"LIPASE\", \"AMYLASE\", \"TSH\", \"T4\", \"T3\", \"CRP\", \"SED\"    Anesthesia Plan    ASA Status:  2       Anesthesia " Type: Epidural.              Consents    Anesthesia Plan(s) and associated risks, benefits, and realistic alternatives discussed. Questions answered and patient/representative(s) expressed understanding.     - Discussed:     - Discussed with:  Patient            Postoperative Care            Comments:    Other Comments: Continuous Labor Epidural: Indication is for labor pain. Following a discussion of the procedure, epidural expectations, and risks and benefits (risks including, but not limited to, nerve damage, infection, bleeding/hematoma, inadvertent dural puncture, spinal headache, partial or failed block possibly requiring epidural replacement), the patient appears to understand and consents to proceed. Questions were encouraged and answered prior to placement.              Cortney Ugalde MD    I have reviewed the pertinent notes and labs in the chart from the past 30 days and (re)examined the patient.  Any updates or changes from those notes are reflected in this note.

## 2024-11-12 NOTE — PROGRESS NOTES
[facility]  Labor Progress Note    S: Patient asked for a  because she feels tired, hasn't slept in a couple of days, has nausea, is concerned about fetal decelerations, has intermittent pain. She feels like she doesn't have the energy to wait for . She had VAVD with P1, she recalls because of fatigue. Pt feels like if she could eat or nap she may have the strength to continue to try for IOL and .     O:   Patient Vitals for the past 4 hrs:   BP Temp Temp src Resp SpO2   2419 -- 97.9  F (36.6  C) Oral 18 --   24 0612 119/65 -- -- -- --   24 0514 -- -- -- -- 100 %   24 0511 121/71 -- -- -- --   24 0435 -- 98.1  F (36.7  C) Oral -- --   24 0411 120/72 -- -- -- 100 %   24 0345 118/58 -- -- -- 99 %   24 0310 116/56 -- -- -- 98 %       SVE Last check: -/-2, head well applied; AROM with clear fluid, IUPC placed posteriorly w/o resistance, FSE placed. Pt declined recheck at this time after counseling.     FHT: 110/mod/+a/+d noted > towards end of counseling: Cat 1 120/mod/+A's/-d  [place]: Ctx q2-3min    A/P:  Ms. Cintia Fang is a 39 year old  at 40w5d here for IOL.    Labor:   - s/p PO cytotec  - s/p epidural  - Pitocin turned off  - AROM approx 00:10  - IUPC and FSE placed 00:10  - S/p amnioinfusion for prior recurrent variable decels    FWB:   - Category 1 FHT currently    PNC:   - Rh A neg, Rubella immune, GBS negative, GCT passed, Placenta anterior    Other:   - GERD > PPI IV given  - Nausea > Reglan, can add zofran prn  - Anxiety and Insomnia > prn hydroxyzine  - Once patient has some clear liquid diet and some brief rest, will plan on reassessing SVE and restart pitocin 1x1. Discussed plan with my on-coming partner.     Counseling: We discussed that maternal exhaustion can be an indication for  as can Cat 2 FHT and failed IOL. At this time, I do not recommend  as currently baby is Cat 1 with accels with  ctx with pitocin turned off. I discussed with patient alternate plans like membrane strip, pitocin 1x1 instead of 2x2 to hopefully avoid coupling. We also discussed positional changes to enhance progression of labor and can watch carefully when patient on maternal left as this has also caused decel in past. We discussed that if active labor is achieved typically that is a more rapid progression of labor. Pt reports with P1 she pushed for under an hour. We discussed that fetus is currently doing well and recovered from prior decelerations and that if  were called it would be non-urgent. We discussed there risks of : including but not limited to: bleeding, hemorrhage, transfusion (and complications), hysterectomy, even death, infection, VTE, anesthesia (and associated risks), injury to surrounding organs (including but not limited to: skin, nerves, vessels, bowel, bladder, pelvic organs, fetus, other organs), complications long into future, wound/incision complications, increased risk in future pregnancies, recommendation for 12-18 month interval to next conception. We discussed with her and her partner possible alternative plan like allowing for nap and food which patient feels like with some calories and some rest she may be able to reset her exhaustion and try again towards vaginal delivery. With fetus cat 1 currently, mom's pain well controlled with epidural, and no signs of IAI s/p AROM, I do feel comfortable with this shared decision-making plan as the alternative for patient at this time seems to be a  only. After answering all patient and her partner's questions we agreed on this shared decision plan and will continue to work towards IOL and .

## 2024-11-12 NOTE — PROVIDER NOTIFICATION
11/12/24 0725   Provider Notification   Provider Name/Title DR Rojas   Method of Notification In Department   Request Evaluate - Remote   Notification Reason Other (Comment)     Provider at the desk, she reviewed her FHT strip and aware of the Pts situation and background,provider gave verbal order to start Pitocin with 2X2 and Provider will do SVE at 0830.POC d/w pt and SO they agreed with Pitocin.

## 2024-11-12 NOTE — ANESTHESIA PROCEDURE NOTES
"Epidural catheter Procedure Note    Pre-Procedure   Staff -        Anesthesiologist:  Cortney Ugalde MD       Performed By: anesthesiologist       Referred By: Nell       Location: OB       Pre-Anesthestic Checklist: patient identified, IV checked, risks and benefits discussed, informed consent, monitors and equipment checked, pre-op evaluation and at physician/surgeon's request  Timeout:       Correct Patient: Yes        Correct Procedure: Yes        Correct Site: Yes        Correct Position: Yes   Procedure Documentation  Procedure: epidural catheter       Diagnosis: Labor pain       Patient Position: sitting       Patient Prep/Sterile Barriers: sterile gloves, mask, patient draped       Skin prep: Chloraprep       Local skin infiltrated with 2 mL of 1% lidocaine.        Insertion Site: L3-4. (midline approach).       Technique: LORT saline        ANGELES at 5 cm.       Needle Type: [place] needle       Needle Gauge: 17.        Needle Length (Inches): 3.5        Catheter: 19 G.          Catheter threaded easily.         5 cm epidural space.         Threaded 10 cm at skin.         # of attempts: 1 and  # of redirects:  0    Assessment/Narrative         Paresthesias: No.       Test dose of 3 mL lidocaine 1.5% w/ 1:200,000 epinephrine at 16:50 CST.         Test dose negative, 3 minutes after injection, for signs of intravascular, subdural, or intrathecal injection.       Insertion/Infusion Method: LORT saline       Aspiration negative for Heme or CSF via Epidural Catheter.    Medication(s) Administered   0.25% Bupivacaine PF (Epidural) - EPIDURAL   10 mL - 11/11/2024 6:53:00 PM    FOR [facility] ([facility]) ONLY:   Pain Team Contact information: please page the Pain Team Via [facility]. Search \"Pain\". During daytime hours, please page the attending first. At night please page the resident first.      "

## 2024-11-12 NOTE — PROVIDER NOTIFICATION
11/11/24 8764   Provider Notification   Provider Name/Title MD Camejo   Method of Notification Electronic Page   Request Evaluate - Remote   Notification Reason Status Update;SVE     Updated provider about recent SVE and FHT via [employer]. FHT present with minimal with baseline of 110 around 2300 for about 20-30 min, now presents with moderate variability and accels, no decels. SVE 5.5/90/-2, minimal change from last check. Pitocin just restarted at 2mu at 2335, ctx q 3-4 min. Pt is okay with being AROM'd at any time.    MD will come to bedside for AROM.

## 2024-11-12 NOTE — PROVIDER NOTIFICATION
11/12/24 0654   Provider Notification   Provider Name/Title MD Camejo   Method of Notification Electronic Page   Notification Reason Decels     Updated provider on FHT. FHT present with recurrent lates and variables with moderate variability. Pt repositioned, baseline recovers back to 110.     MD requesting for 2nd amnioinfusion, will give report to on coming provider and reassess plan this morning.

## 2024-11-13 VITALS
RESPIRATION RATE: 16 BRPM | BODY MASS INDEX: 30.23 KG/M2 | SYSTOLIC BLOOD PRESSURE: 108 MMHG | TEMPERATURE: 98.6 F | WEIGHT: 177.1 LBS | DIASTOLIC BLOOD PRESSURE: 51 MMHG | HEIGHT: 64 IN | OXYGEN SATURATION: 100 % | HEART RATE: 81 BPM

## 2024-11-13 RX ORDER — HYDROCORTISONE 25 MG/G
CREAM TOPICAL 3 TIMES DAILY PRN
Qty: 30 G | Refills: 0 | Status: SHIPPED | OUTPATIENT
Start: 2024-11-13

## 2024-11-13 RX ORDER — ACETAMINOPHEN 325 MG/1
650 TABLET ORAL EVERY 4 HOURS PRN
Qty: 40 TABLET | Refills: 1 | Status: SHIPPED | OUTPATIENT
Start: 2024-11-13

## 2024-11-13 RX ORDER — IBUPROFEN 800 MG/1
800 TABLET, FILM COATED ORAL EVERY 6 HOURS PRN
Qty: 40 TABLET | Refills: 1 | Status: SHIPPED | OUTPATIENT
Start: 2024-11-13

## 2024-11-13 RX ORDER — MODIFIED LANOLIN
OINTMENT (GRAM) TOPICAL
Qty: 7 G | Refills: 3 | Status: SHIPPED | OUTPATIENT
Start: 2024-11-13

## 2024-11-13 RX ORDER — DOCUSATE SODIUM 100 MG/1
100 CAPSULE, LIQUID FILLED ORAL DAILY
Qty: 30 CAPSULE | Refills: 0 | Status: SHIPPED | OUTPATIENT
Start: 2024-11-13

## 2024-11-13 ASSESSMENT — ACTIVITIES OF DAILY LIVING (ADL)
ADLS_ACUITY_SCORE: 0
DEPENDENT_IADLS:: INDEPENDENT
ADLS_ACUITY_SCORE: 0

## 2024-11-13 NOTE — PROGRESS NOTES
"[facility] Postpartum Note    S:  Cintia Fang feels well this morning. Was able to sleep last night. Pain control adequate. Lochia minimal. Voiding. Breastfeeding. Mood Good.     O:  Vitals were reviewed  Blood pressure 108/51, pulse 81, temperature 98.6  F (37  C), temperature source Oral, resp. rate 16, height 1.626 m (5' 4\"), weight 82.1 kg (181 lb), SpO2 100%, unknown if currently breastfeeding.    General: healthy, alert, and no distress  Abd: soft, appropriately tender, fundus firm  Legs: Non-tender, 0+ pitting edema    No results found for: \"RH\"  Rubella: immune    Assessment and Plan:   Postpartum Day #1, status post vaginal delivery, doing well.  -- Discharge home  -- F/U 6 weeks w/ Primary OB  -- Discharge meds: see med rec  -- Contraception: considering    Advanced Maternal Age, >=35:   - On 9/26, EFW 31% AC 46%    Anxiety, Depression:   - No current medications. Mood stable, no SI/HI.   - Close attention to mood in the postpartum period.      Asthma:   - Avoid Hemabate    Holley Barrera MD  "

## 2024-11-13 NOTE — DISCHARGE SUMMARY
Discharge instructions completed.  Patient states she understands all discharge instructions and all of her questions have been answered.  Patient verbalizes when she needs to return to clinic for follow up for herself and baby.  She is caring for herself and her baby independently.  Prescriptions filled and given to patient/family.  Postpartum depression symptoms reviewed and encouraged frequent review of depression scale. Breast pump at home. Patient discharged with baby.

## 2024-11-13 NOTE — PLAN OF CARE
Goal Outcome Evaluation:      Plan of Care Reviewed With: patient, spouse    Overall Patient Progress: improvingOverall Patient Progress: improving    Outcome Evaluation: Patient and  bonding well with infant. Patient is voiding independently. Pain level acceptable to patient. Patient is breastfeeding infant. Up independent in room    Data: Vital signs within normal limits. Postpartum checks within normal limits - see flow record. Patient eating and drinking normally. Patient able to empty bladder independently and is up ambulating. No apparent signs of infection. Perineal lacerations , patient is using ice packs, tucks.  Patient performing self cares and is able to care for infant.  Action: Patient medicated during the shift for pain and cramping. See MAR. Patient reassessed within 1 hour after each medication and pain was improved - patient stated she was comfortable. Patient education done about plan of care, medications, breastfeeding help,  See flow record.  Response: Positive attachment behaviors observed with infant.  has been at the bedside.   Plan: Anticipate discharge on 11/14/24      Problem: Adult Inpatient Plan of Care  Goal: Plan of Care Review  Description: The Plan of Care Review/Shift note should be completed every shift.  The Outcome Evaluation is a brief statement about your assessment that the patient is improving, declining, or no change.  This information will be displayed automatically on your shift  note.  Outcome: Progressing  Flowsheets (Taken 11/12/2024 1700)  Outcome Evaluation: Patient and  bonding well with infant. Patient is voiding independently. Pain level acceptable to patient. Patient is breastfeeding infant. Up independent in room  Plan of Care Reviewed With:   patient   spouse  Overall Patient Progress: improving  Goal: Patient-Specific Goal (Individualized)  Description: You can add care plan individualizations to a care plan. Examples of Individualization  "might be:  \"Parent requests to be called daily at 9am for status\", \"I have a hard time hearing out of my right ear\", or \"Do not touch me to wake me up as it startles  me\".  Outcome: Progressing  Goal: Absence of Hospital-Acquired Illness or Injury  Outcome: Progressing  Intervention: Prevent Skin Injury  Recent Flowsheet Documentation  Taken 11/12/2024 1413 by Alexus Carlos, RN  Body Position: position changed independently  Goal: Optimal Comfort and Wellbeing  Outcome: Progressing  Intervention: Provide Person-Centered Care  Recent Flowsheet Documentation  Taken 11/12/2024 1413 by Alexus Carlos, RN  Trust Relationship/Rapport:   care explained   choices provided   emotional support provided   empathic listening provided   questions answered   questions encouraged   reassurance provided   thoughts/feelings acknowledged  Goal: Readiness for Transition of Care  Outcome: Progressing     "

## 2024-11-13 NOTE — PLAN OF CARE
"Vital signs stable. Fundus is firm and midline, scant lochia. Voiding spontaneously. Ambulating independently, denies dizziness. Denies pain. Breastfeeding every 2-3 hours, encouraged mother to call for breastfeeding assessment with next feed. Attentive to  cues.    Problem: Adult Inpatient Plan of Care  Goal: Plan of Care Review  Description: The Plan of Care Review/Shift note should be completed every shift.  The Outcome Evaluation is a brief statement about your assessment that the patient is improving, declining, or no change.  This information will be displayed automatically on your shift  note.  Outcome: Progressing  Flowsheets (Taken 2024 1039)  Plan of Care Reviewed With:   patient   spouse  Overall Patient Progress: improving  Goal: Patient-Specific Goal (Individualized)  Description: You can add care plan individualizations to a care plan. Examples of Individualization might be:  \"Parent requests to be called daily at 9am for status\", \"I have a hard time hearing out of my right ear\", or \"Do not touch me to wake me up as it startles  me\".  Outcome: Progressing  Goal: Absence of Hospital-Acquired Illness or Injury  Outcome: Progressing  Intervention: Prevent Skin Injury  Recent Flowsheet Documentation  Taken 202451 by Terese Puentes RN  Body Position: position changed independently  Intervention: Prevent Infection  Recent Flowsheet Documentation  Taken 2024 by Terese Puentes RN  Infection Prevention: rest/sleep promoted  Goal: Optimal Comfort and Wellbeing  Outcome: Progressing  Intervention: Provide Person-Centered Care  Recent Flowsheet Documentation  Taken 2024 by Terese Puentes RN  Trust Relationship/Rapport:   care explained   choices provided   questions answered   questions encouraged   reassurance provided   thoughts/feelings acknowledged  Goal: Readiness for Transition of Care  Outcome: Progressing     Problem: Postpartum (Vaginal Delivery)  Goal: " Successful Parent Role Transition  Outcome: Progressing  Intervention: Support Parent Role Transition  Recent Flowsheet Documentation  Taken 11/13/2024 0851 by Terese Puentes RN  Supportive Measures:   active listening utilized   decision-making supported   positive reinforcement provided   problem-solving facilitated   self-care encouraged  Parent-Child Attachment Promotion:   caring behavior modeled   cue recognition promoted   face-to-face positioning promoted   interaction encouraged   parent/caregiver presence encouraged   participation in care promoted   positive reinforcement provided   rooming-in promoted  Goal: Hemostasis  Outcome: Progressing  Goal: Absence of Infection Signs and Symptoms  Outcome: Progressing   Goal Outcome Evaluation:      Plan of Care Reviewed With: patient, spouse    Overall Patient Progress: improvingOverall Patient Progress: improving

## 2024-11-13 NOTE — PLAN OF CARE
Data: Vital signs within normal limits. Postpartum checks within normal limits - see flow record. Patient eating and drinking normally. Patient able to empty bladder independently and is up ambulating. No apparent signs of infection. Patient performing self cares, is able to care for infant and is breastfeeding every 2-3 hours.   Laceration  appears within normal. Is using no pain medication for any discomfort.    Action: Patient education done, see flow record.  Response: Positive attachment behaviors observed with infant. Patient's spouse present this shift.   Plan: Continue current plan of care.  Anticipate discharge on 11/13.      Goal Outcome Evaluation:      Plan of Care Reviewed With: patient    Overall Patient Progress: improvingOverall Patient Progress: improving       Problem: Adult Inpatient Plan of Care  Goal: Plan of Care Review  Description: The Plan of Care Review/Shift note should be completed every shift.  The Outcome Evaluation is a brief statement about your assessment that the patient is improving, declining, or no change.  This information will be displayed automatically on your shift  note.  Outcome: Progressing  Flowsheets (Taken 11/13/2024 0234)  Plan of Care Reviewed With: patient  Overall Patient Progress: improving  Goal: Absence of Hospital-Acquired Illness or Injury  Intervention: Prevent Skin Injury  Recent Flowsheet Documentation  Taken 11/13/2024 0026 by Jorge Lala RN  Body Position: position changed independently  Goal: Optimal Comfort and Wellbeing  Intervention: Provide Person-Centered Care  Recent Flowsheet Documentation  Taken 11/13/2024 0026 by Jorge Lala RN  Trust Relationship/Rapport:   care explained   choices provided   emotional support provided   empathic listening provided   questions answered   questions encouraged     Problem: Labor  Goal: Stable Fetal Wellbeing  Intervention: Promote and Monitor Fetal Wellbeing  Recent Flowsheet  "Documentation  Taken 11/13/2024 0026 by Jorge Laal RN  Body Position: position changed independently     Problem: Adult Inpatient Plan of Care  Goal: Plan of Care Review  Description: The Plan of Care Review/Shift note should be completed every shift.  The Outcome Evaluation is a brief statement about your assessment that the patient is improving, declining, or no change.  This information will be displayed automatically on your shift  note.  Outcome: Progressing  Flowsheets (Taken 11/13/2024 0234)  Plan of Care Reviewed With: patient  Overall Patient Progress: improving  Goal: Patient-Specific Goal (Individualized)  Description: You can add care plan individualizations to a care plan. Examples of Individualization might be:  \"Parent requests to be called daily at 9am for status\", \"I have a hard time hearing out of my right ear\", or \"Do not touch me to wake me up as it startles  me\".  Outcome: Progressing  Goal: Absence of Hospital-Acquired Illness or Injury  Outcome: Progressing  Intervention: Prevent Skin Injury  Recent Flowsheet Documentation  Taken 11/13/2024 0026 by Jorge Lala RN  Body Position: position changed independently  Goal: Optimal Comfort and Wellbeing  Outcome: Progressing  Intervention: Provide Person-Centered Care  Recent Flowsheet Documentation  Taken 11/13/2024 0026 by Jorge Lala RN  Trust Relationship/Rapport:   care explained   choices provided   emotional support provided   empathic listening provided   questions answered   questions encouraged  Goal: Readiness for Transition of Care  Outcome: Progressing     Problem: Postpartum (Vaginal Delivery)  Goal: Successful Parent Role Transition  Outcome: Progressing  Goal: Hemostasis  Outcome: Progressing  Goal: Absence of Infection Signs and Symptoms  Outcome: Progressing         "

## 2024-11-13 NOTE — DISCHARGE INSTRUCTIONS
Warning Signs after Having a Baby    Keep this paper on your fridge or somewhere else where you can see it.    Call your provider if you have any of these symptoms up to 12 weeks after having your baby.    Thoughts of hurting yourself or your baby  Pain in your chest or trouble breathing  Severe headache not helped by pain medicine  Eyesight concerns (blurry vision, seeing spots or flashes of light, other changes to eyesight)  Fainting, shaking or other signs of a seizure    Call 9-1-1 if you feel that it is an emergency.     The symptoms below can happen to anyone after giving birth. They can be very serious. Call your provider if you have any of these warning signs.    My provider s phone number: _______________________    Losing too much blood (hemorrhage)    Call your provider if you soak through a pad in less than an hour or pass blood clots bigger than a golf ball. These may be signs that you are bleeding too much.    Blood clots in the legs or lungs    After you give birth, your body naturally clots its blood to help prevent blood loss. Sometimes this increased clotting can happen in other areas of the body, like the legs or lungs. This can block your blood flow and be very dangerous.     Call your provider if you:  Have a red, swollen spot on the back of your leg that is warm or painful when you touch it.   Are coughing up blood.     Infection    Call your provider if you have any of these symptoms:  Fever of 100.4 F (38 C) or higher.  Pain or redness around your stitches if you had an incision.   Any yellow, white, or green fluid coming from places where you had stitches or surgery.    Mood Problems (postpartum depression)    Many people feel sad or have mood changes after having a baby. But for some people, these mood swings are worse.     Call your provider right away if you feel so anxious or nervous that you can't care for yourself or your baby.    Preeclampsia (high blood pressure)    Even if you  didn't have high blood pressure when you were pregnant, you are at risk for the high blood pressure disease called preeclampsia. This risk can last up to 12 weeks after giving birth.     Call your provider if you have:   Pain on your right side under your rib cage  Sudden swelling in the hands and face    Remember: You know your body. If something doesn't feel right, get medical help.     For informational purposes only. Not to replace the advice of your health care provider. Copyright 2020 [facility]. All rights reserved. Clinically reviewed by Jannet Layne, RNC-OB, MSN. [employer] 933125 - Rev 02/23.